# Patient Record
Sex: FEMALE | Race: WHITE | NOT HISPANIC OR LATINO | Employment: FULL TIME | URBAN - METROPOLITAN AREA
[De-identification: names, ages, dates, MRNs, and addresses within clinical notes are randomized per-mention and may not be internally consistent; named-entity substitution may affect disease eponyms.]

---

## 2017-10-18 ENCOUNTER — HOSPITAL ENCOUNTER (OUTPATIENT)
Dept: MAMMOGRAPHY | Facility: CLINIC | Age: 57
Discharge: HOME | End: 2017-10-18
Attending: OBSTETRICS & GYNECOLOGY | Admitting: OBSTETRICS & GYNECOLOGY
Payer: COMMERCIAL

## 2017-10-18 DIAGNOSIS — Z12.31 VISIT FOR SCREENING MAMMOGRAM: ICD-10-CM

## 2017-10-18 PROCEDURE — G0202 SCR MAMMO BI INCL CAD: HCPCS

## 2017-10-18 PROCEDURE — 77063 BREAST TOMOSYNTHESIS BI: CPT

## 2018-12-26 ENCOUNTER — HOSPITAL ENCOUNTER (OUTPATIENT)
Dept: MAMMOGRAPHY | Facility: CLINIC | Age: 58
Discharge: HOME | End: 2018-12-26
Attending: NURSE PRACTITIONER | Admitting: NURSE PRACTITIONER
Payer: COMMERCIAL

## 2018-12-26 DIAGNOSIS — Z12.31 VISIT FOR SCREENING MAMMOGRAM: ICD-10-CM

## 2018-12-26 PROCEDURE — 77063 BREAST TOMOSYNTHESIS BI: CPT

## 2019-02-15 ENCOUNTER — HEALTH MAINTENANCE LETTER (OUTPATIENT)
Age: 59
End: 2019-02-15

## 2019-10-02 ENCOUNTER — HEALTH MAINTENANCE LETTER (OUTPATIENT)
Age: 59
End: 2019-10-02

## 2020-01-06 ENCOUNTER — HOSPITAL ENCOUNTER (OUTPATIENT)
Dept: MAMMOGRAPHY | Facility: CLINIC | Age: 60
Discharge: HOME | End: 2020-01-06
Attending: OBSTETRICS & GYNECOLOGY | Admitting: OBSTETRICS & GYNECOLOGY
Payer: COMMERCIAL

## 2020-01-06 DIAGNOSIS — Z12.31 VISIT FOR SCREENING MAMMOGRAM: ICD-10-CM

## 2020-01-06 PROCEDURE — 77067 SCR MAMMO BI INCL CAD: CPT

## 2020-12-19 ENCOUNTER — APPOINTMENT (OUTPATIENT)
Dept: GENERAL RADIOLOGY | Facility: CLINIC | Age: 60
End: 2020-12-19
Attending: EMERGENCY MEDICINE
Payer: COMMERCIAL

## 2020-12-19 ENCOUNTER — HOSPITAL ENCOUNTER (EMERGENCY)
Facility: CLINIC | Age: 60
Discharge: HOME | End: 2020-12-19
Attending: EMERGENCY MEDICINE | Admitting: EMERGENCY MEDICINE
Payer: COMMERCIAL

## 2020-12-19 VITALS
SYSTOLIC BLOOD PRESSURE: 121 MMHG | HEART RATE: 85 BPM | DIASTOLIC BLOOD PRESSURE: 84 MMHG | HEIGHT: 67 IN | WEIGHT: 175 LBS | OXYGEN SATURATION: 96 % | BODY MASS INDEX: 27.47 KG/M2 | TEMPERATURE: 98.2 F | RESPIRATION RATE: 16 BRPM

## 2020-12-19 DIAGNOSIS — S00.03XA CONTUSION OF SCALP, INITIAL ENCOUNTER: ICD-10-CM

## 2020-12-19 DIAGNOSIS — T14.8XXA ABRASION: ICD-10-CM

## 2020-12-19 DIAGNOSIS — S09.90XA CLOSED HEAD INJURY, INITIAL ENCOUNTER: ICD-10-CM
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
## 2020-12-19 DIAGNOSIS — S20.212A RIB CONTUSION, LEFT, INITIAL ENCOUNTER: ICD-10-CM

## 2020-12-19 PROCEDURE — 71046 X-RAY EXAM CHEST 2 VIEWS: CPT

## 2020-12-19 PROCEDURE — 99283 EMERGENCY DEPT VISIT LOW MDM: CPT | Mod: 25

## 2020-12-19 ASSESSMENT — ENCOUNTER SYMPTOMS
NECK PAIN: 0
BACK PAIN: 1
ABDOMINAL PAIN: 0
ARTHRALGIAS: 0
HEADACHES: 1
WOUND: 1

## 2020-12-19 ASSESSMENT — MIFFLIN-ST. JEOR
SCORE: 1401.42
SCORE: 1401.42

## 2020-12-19 NOTE — ED PROVIDER NOTES
"  History   Chief Complaint:  Fall    HPI  Deepthi Villatoro is a 60 year old female who presents to the ED for evaluation after a fall. The patient reports walking down to the mail box earlier in the day when she slipped on thin ice and fell onto her back. She states she hit her head very hard, but did not lose consciousness. After the fall, she noticed a soreness around her back left ribcage area. She states it does not hurt much when she is sitting, but when she exerts herself, such as walking up stairs, it hurts a lot. She denies having a headache after the fall, but at the ED, she states the pain is a 4/10 and is beginning to throb. She received a small wound on her ankle, but it does not hurt. She denies any neck pain, spinal pain, abdominal pain, arm pain, or other pain.     Allergies:  Ibuprofen    Medications:    The patient is not currently taking any prescribed medications.    Past Medical History:    Facial nerve disorders  Rotator cuff sprain      Past Surgical History:    Breast biopsy - right    Family History:    CAD  Diabetes mellitus type II    Social History:  Marital Status:   Smoking status: No  Alcohol use: Yes  Drug use: No  PCP: [facility]    Review of Systems   Gastrointestinal: Negative for abdominal pain.   Musculoskeletal: Positive for back pain (left rib cage). Negative for arthralgias, gait problem and neck pain.   Skin: Positive for wound (Ankle).   Neurological: Positive for headaches. Negative for syncope.   All other systems reviewed and are negative.    Physical Exam     Patient Vitals for the past 24 hrs:   BP Temp Temp src Pulse Resp SpO2 Height Weight   12/19/20 1535 121/84 -- -- 85 -- 96 % -- --   12/19/20 1400 (!) 124/94 -- -- 93 -- 98 % -- --   12/19/20 1352 120/84 98.2  F (36.8  C) Oral 99 16 99 % 1.702 m (5' 7\") 79.4 kg (175 lb)   12/19/20 1335 (!) 166/96 98.1  F (36.7  C) Temporal 96 16 97 % 1.702 m (5' 7\") 79.4 kg (175 lb)       Physical " Exam  SKIN: Superficial abrasion to the anterior left ankle.  No ecchymoses of the left posterior torso.  No palpable soft tissue crepitus of the left posterior torso.  HEMATOLOGIC/IMMUNOLOGIC/LYMPHATIC:  No pallor.  HENT:  No facial trauma.  Superior occipital left-sided scalp contusion, 6 cm.  EYES: Normal conjunctiva.  Normal extraocular motion.  CARDIOVASCULAR:  Normal rate and a regular rhythm.  RESPIRATORY:  No respiratory distress, breath sounds equal and normal.  GASTROINTESTINAL:  Soft nontender abdomen.  MUSCULOSKELETAL: Painless active range of motion of the head neck and extremities.  Nontender left ankle and foot.  Range of motion of torso exacerbates left posterior thoracic pain.  NEUROLOGIC:  Alert, conversant, GCS 15.  Oriented to self place and time.  No aphasia or dysarthria.  No gross motor deficit.  No gross tactile sensory deficit.  PSYCHIATRIC:  Normal mood.    Emergency Department Course   Imaging:  Radiology findings were communicated with the patient who voiced understanding of the findings.    Chest XR, PA & LAT:  No acute cardiopulmonary disease.     Reading per radiology    Emergency Department Course:    Reviewed:  I reviewed the patient's nursing notes, vitals, past medical records, Care Everywhere.     Assessments:  1354 I first assessed the patient and performed an exam.    1516 I rechecked the patient and updated her on her results. Discussed plans for discharge.    Disposition:  The patient was discharged to home.       Impression & Plan    Medical Decision Making:   Deepthi Villatoro is a 60 year old female who presents after a fall from a standing height, striking her head and injuring her left posterior thorax. An associated injury to the left ankle, but just an abrasion. Based on the history and the exam, I did not think she required brain imaging. I was concerned for pneumothorax or rib fracture, hence imageing performed of the chest, gladly negative. I advised conservative  management of her injuries with ice, tylenol or ibuprofen but to return if any concerns regarding her trauma.    Diagnosis:     ICD-10-CM    1. Closed head injury, initial encounter  S09.90XA    2. Contusion of scalp, initial encounter  S00.03XA    3. Rib contusion, left, initial encounter  S20.212A    4. Abrasion  T14.8XXA    5. Fall, initial encounter  W19.XXXA        Disposition:   Discharged to home.    Scribe Disclosure:  YAEL, Gabrielle Gr, am serving as a scribe on 12/19/2020 at 1:54 PM to personally document services performed by Watson Valle MD based on my observations and the provider's statements to me.           Watson Valle MD  12/19/20 [id number]

## 2020-12-19 NOTE — ED NOTES
Pt provided with ice for posterior head, denies pain medications at this time. Will continue to monitor.

## 2020-12-19 NOTE — ED AVS SNAPSHOT
[facility]  [address] [address]  Phone: [phone]  Fax: [phone]                                    Deepthi Villatoro   MRN: [id number]    Department: [facility]   Date of Visit: 12/19/2020           After Visit Summary Signature Page    I have received my discharge instructions, and my questions have been answered. I have discussed any challenges I see with this plan with the nurse or doctor.    ..........................................................................................................................................  Patient/Patient Representative Signature      ..........................................................................................................................................  Patient Representative Print Name and Relationship to Patient    ..................................................               ................................................  Date                                   Time    ..........................................................................................................................................  Reviewed by Signature/Title    ...................................................              ..............................................  Date                                               Time          22EPIC Rev 08/18

## 2020-12-19 NOTE — ED TRIAGE NOTES
Pt slipped on ice in the driveway. Pt states when she slipped she hit her posterior head, L flank, and R ankle.

## 2021-01-15 ENCOUNTER — HEALTH MAINTENANCE LETTER (OUTPATIENT)
Age: 61
End: 2021-01-15

## 2021-01-25 ENCOUNTER — HOSPITAL ENCOUNTER (OUTPATIENT)
Dept: MAMMOGRAPHY | Facility: CLINIC | Age: 61
Discharge: HOME | End: 2021-01-25
Attending: OBSTETRICS & GYNECOLOGY | Admitting: OBSTETRICS & GYNECOLOGY
Payer: COMMERCIAL

## 2021-01-25 DIAGNOSIS — Z12.31 VISIT FOR SCREENING MAMMOGRAM: ICD-10-CM

## 2021-01-25 PROCEDURE — 77063 BREAST TOMOSYNTHESIS BI: CPT

## 2021-09-04 ENCOUNTER — HEALTH MAINTENANCE LETTER (OUTPATIENT)
Age: 61
End: 2021-09-04

## 2021-11-25 ENCOUNTER — APPOINTMENT (OUTPATIENT)
Dept: GENERAL RADIOLOGY | Facility: CLINIC | Age: 61
DRG: 273 | End: 2021-11-25
Attending: NURSE PRACTITIONER
Payer: COMMERCIAL

## 2021-11-25 ENCOUNTER — HOSPITAL ENCOUNTER (INPATIENT)
Facility: CLINIC | Age: 61
LOS: 2 days | Discharge: HOME | DRG: 273 | End: 2021-11-27
Attending: EMERGENCY MEDICINE | Admitting: INTERNAL MEDICINE
Payer: COMMERCIAL

## 2021-11-25 DIAGNOSIS — I47.10 PAROXYSMAL SUPRAVENTRICULAR TACHYCARDIA (H): ICD-10-CM

## 2021-11-25 DIAGNOSIS — I21.4 NSTEMI (NON-ST ELEVATED MYOCARDIAL INFARCTION) (H): Primary | ICD-10-CM

## 2021-11-25 LAB
ANION GAP SERPL CALCULATED.3IONS-SCNC: 5 MMOL/L (ref 3–14)
APTT PPP: 30 SECONDS (ref 22–38)
ATRIAL RATE - MUSE: 110 BPM
ATRIAL RATE - MUSE: 220 BPM
ATRIAL RATE - MUSE: 83 BPM
BUN SERPL-MCNC: 14 MG/DL (ref 7–30)
CALCIUM SERPL-MCNC: 8.7 MG/DL (ref 8.5–10.1)
CHLORIDE BLD-SCNC: 107 MMOL/L (ref 94–109)
CO2 SERPL-SCNC: 28 MMOL/L (ref 20–32)
CREAT SERPL-MCNC: 0.81 MG/DL (ref 0.52–1.04)
DIASTOLIC BLOOD PRESSURE - MUSE: NORMAL MMHG
ERYTHROCYTE [DISTWIDTH] IN BLOOD BY AUTOMATED COUNT: 13.2 % (ref 10–15)
ERYTHROCYTE [DISTWIDTH] IN BLOOD BY AUTOMATED COUNT: 13.3 % (ref 10–15)
GFR SERPL CREATININE-BSD FRML MDRD: 79 ML/MIN/1.73M2
GLUCOSE BLD-MCNC: 147 MG/DL (ref 70–99)
HCT VFR BLD AUTO: 39.3 % (ref 35–47)
HCT VFR BLD AUTO: 44.3 % (ref 35–47)
HGB BLD-MCNC: 12.6 G/DL (ref 11.7–15.7)
HGB BLD-MCNC: 14.3 G/DL (ref 11.7–15.7)
HOLD SPECIMEN: NORMAL
INR PPP: 0.94 (ref 0.85–1.15)
INTERPRETATION ECG - MUSE: NORMAL
MAGNESIUM SERPL-MCNC: 2.1 MG/DL (ref 1.6–2.3)
MCH RBC QN AUTO: 28.4 PG (ref 26.5–33)
MCH RBC QN AUTO: 28.9 PG (ref 26.5–33)
MCHC RBC AUTO-ENTMCNC: 32.1 G/DL (ref 31.5–36.5)
MCHC RBC AUTO-ENTMCNC: 32.3 G/DL (ref 31.5–36.5)
MCV RBC AUTO: 89 FL (ref 78–100)
MCV RBC AUTO: 90 FL (ref 78–100)
NT-PROBNP SERPL-MCNC: 53 PG/ML (ref 0–900)
P AXIS - MUSE: 62 DEGREES
P AXIS - MUSE: 63 DEGREES
P AXIS - MUSE: NORMAL DEGREES
PLATELET # BLD AUTO: 277 10E3/UL (ref 150–450)
PLATELET # BLD AUTO: 336 10E3/UL (ref 150–450)
POTASSIUM BLD-SCNC: 3.5 MMOL/L (ref 3.4–5.3)
PR INTERVAL - MUSE: 144 MS
PR INTERVAL - MUSE: 148 MS
PR INTERVAL - MUSE: NORMAL MS
QRS DURATION - MUSE: 162 MS
QRS DURATION - MUSE: 76 MS
QRS DURATION - MUSE: 86 MS
QT - MUSE: 206 MS
QT - MUSE: 342 MS
QT - MUSE: 372 MS
QTC - MUSE: 393 MS
QTC - MUSE: 437 MS
QTC - MUSE: 462 MS
R AXIS - MUSE: 22 DEGREES
R AXIS - MUSE: 33 DEGREES
R AXIS - MUSE: 37 DEGREES
RBC # BLD AUTO: 4.44 10E6/UL (ref 3.8–5.2)
RBC # BLD AUTO: 4.95 10E6/UL (ref 3.8–5.2)
SARS-COV-2 RNA RESP QL NAA+PROBE: NEGATIVE
SODIUM SERPL-SCNC: 140 MMOL/L (ref 133–144)
SYSTOLIC BLOOD PRESSURE - MUSE: NORMAL MMHG
T AXIS - MUSE: 0 DEGREES
T AXIS - MUSE: 21 DEGREES
T AXIS - MUSE: 52 DEGREES
TROPONIN I SERPL HS-MCNC: 11 NG/L
TROPONIN I SERPL HS-MCNC: 721 NG/L
TROPONIN I SERPL-MCNC: 0.84 UG/L (ref 0–0.04)
TROPONIN I SERPL-MCNC: <0.015 UG/L (ref 0–0.04)
TSH SERPL DL<=0.005 MIU/L-ACNC: 2.13 MU/L (ref 0.4–4)
VENTRICULAR RATE- MUSE: 110 BPM
VENTRICULAR RATE- MUSE: 219 BPM
VENTRICULAR RATE- MUSE: 83 BPM
WBC # BLD AUTO: 11.1 10E3/UL (ref 4–11)
WBC # BLD AUTO: 13.4 10E3/UL (ref 4–11)

## 2021-11-25 PROCEDURE — 84443 ASSAY THYROID STIM HORMONE: CPT | Performed by: PHYSICIAN ASSISTANT

## 2021-11-25 PROCEDURE — 96376 TX/PRO/DX INJ SAME DRUG ADON: CPT

## 2021-11-25 PROCEDURE — 250N000009 HC RX 250: Performed by: EMERGENCY MEDICINE

## 2021-11-25 PROCEDURE — 96365 THER/PROPH/DIAG IV INF INIT: CPT

## 2021-11-25 PROCEDURE — 83880 ASSAY OF NATRIURETIC PEPTIDE: CPT | Performed by: EMERGENCY MEDICINE

## 2021-11-25 PROCEDURE — 210N000002 HC R&B HEART CARE

## 2021-11-25 PROCEDURE — 71046 X-RAY EXAM CHEST 2 VIEWS: CPT

## 2021-11-25 PROCEDURE — 99285 EMERGENCY DEPT VISIT HI MDM: CPT | Mod: 25

## 2021-11-25 PROCEDURE — 83735 ASSAY OF MAGNESIUM: CPT | Performed by: PHYSICIAN ASSISTANT

## 2021-11-25 PROCEDURE — C9803 HOPD COVID-19 SPEC COLLECT: HCPCS

## 2021-11-25 PROCEDURE — 85027 COMPLETE CBC AUTOMATED: CPT | Performed by: PHYSICIAN ASSISTANT

## 2021-11-25 PROCEDURE — 258N000003 HC RX IP 258 OP 636: Performed by: EMERGENCY MEDICINE

## 2021-11-25 PROCEDURE — 99223 1ST HOSP IP/OBS HIGH 75: CPT | Mod: AI | Performed by: PHYSICIAN ASSISTANT

## 2021-11-25 PROCEDURE — 36415 COLL VENOUS BLD VENIPUNCTURE: CPT | Performed by: EMERGENCY MEDICINE

## 2021-11-25 PROCEDURE — 84484 ASSAY OF TROPONIN QUANT: CPT | Mod: 91 | Performed by: NURSE PRACTITIONER

## 2021-11-25 PROCEDURE — 250N000011 HC RX IP 250 OP 636: Performed by: EMERGENCY MEDICINE

## 2021-11-25 PROCEDURE — 85610 PROTHROMBIN TIME: CPT | Performed by: EMERGENCY MEDICINE

## 2021-11-25 PROCEDURE — 85730 THROMBOPLASTIN TIME PARTIAL: CPT | Performed by: EMERGENCY MEDICINE

## 2021-11-25 PROCEDURE — 85027 COMPLETE CBC AUTOMATED: CPT | Performed by: NURSE PRACTITIONER

## 2021-11-25 PROCEDURE — 250N000013 HC RX MED GY IP 250 OP 250 PS 637: Performed by: PHYSICIAN ASSISTANT

## 2021-11-25 PROCEDURE — 96375 TX/PRO/DX INJ NEW DRUG ADDON: CPT

## 2021-11-25 PROCEDURE — 84484 ASSAY OF TROPONIN QUANT: CPT | Performed by: PHYSICIAN ASSISTANT

## 2021-11-25 PROCEDURE — 36415 COLL VENOUS BLD VENIPUNCTURE: CPT | Performed by: PHYSICIAN ASSISTANT

## 2021-11-25 PROCEDURE — 93005 ELECTROCARDIOGRAM TRACING: CPT | Mod: 76

## 2021-11-25 PROCEDURE — 87635 SARS-COV-2 COVID-19 AMP PRB: CPT | Performed by: EMERGENCY MEDICINE

## 2021-11-25 PROCEDURE — 93005 ELECTROCARDIOGRAM TRACING: CPT

## 2021-11-25 PROCEDURE — 82310 ASSAY OF CALCIUM: CPT | Performed by: NURSE PRACTITIONER

## 2021-11-25 RX ORDER — LIFITEGRAST 50 MG/ML
1 SOLUTION/ DROPS OPHTHALMIC 2 TIMES DAILY
COMMUNITY

## 2021-11-25 RX ORDER — HEPARIN SODIUM 10000 [USP'U]/100ML
0-5000 INJECTION, SOLUTION INTRAVENOUS CONTINUOUS
Status: DISCONTINUED | OUTPATIENT
Start: 2021-11-25 | End: 2021-11-25

## 2021-11-25 RX ORDER — VITAMIN B COMPLEX
25 TABLET ORAL DAILY
COMMUNITY

## 2021-11-25 RX ORDER — ACETAMINOPHEN 325 MG/1
650 TABLET ORAL EVERY 6 HOURS PRN
Status: DISCONTINUED | OUTPATIENT
Start: 2021-11-25 | End: 2021-11-26

## 2021-11-25 RX ORDER — ONDANSETRON 2 MG/ML
4 INJECTION INTRAMUSCULAR; INTRAVENOUS EVERY 6 HOURS PRN
Status: DISCONTINUED | OUTPATIENT
Start: 2021-11-25 | End: 2021-11-26

## 2021-11-25 RX ORDER — ACETAMINOPHEN 650 MG/1
650 SUPPOSITORY RECTAL EVERY 6 HOURS PRN
Status: DISCONTINUED | OUTPATIENT
Start: 2021-11-25 | End: 2021-11-26

## 2021-11-25 RX ORDER — LIDOCAINE 40 MG/G
CREAM TOPICAL
Status: DISCONTINUED | OUTPATIENT
Start: 2021-11-25 | End: 2021-11-26

## 2021-11-25 RX ORDER — NITROGLYCERIN 0.4 MG/1
0.4 TABLET SUBLINGUAL EVERY 5 MIN PRN
Status: DISCONTINUED | OUTPATIENT
Start: 2021-11-25 | End: 2021-11-26

## 2021-11-25 RX ORDER — HEPARIN SODIUM 10000 [USP'U]/100ML
0-5000 INJECTION, SOLUTION INTRAVENOUS CONTINUOUS
Status: DISCONTINUED | OUTPATIENT
Start: 2021-11-25 | End: 2021-11-26

## 2021-11-25 RX ORDER — METOPROLOL TARTRATE 1 MG/ML
5 INJECTION, SOLUTION INTRAVENOUS ONCE
Status: COMPLETED | OUTPATIENT
Start: 2021-11-25 | End: 2021-11-25

## 2021-11-25 RX ORDER — ONDANSETRON 4 MG/1
4 TABLET, ORALLY DISINTEGRATING ORAL EVERY 6 HOURS PRN
Status: DISCONTINUED | OUTPATIENT
Start: 2021-11-25 | End: 2021-11-26

## 2021-11-25 RX ADMIN — METOPROLOL TARTRATE 12.5 MG: 25 TABLET, FILM COATED ORAL at 22:38

## 2021-11-25 RX ADMIN — METOPROLOL TARTRATE 5 MG: 5 INJECTION INTRAVENOUS at 17:53

## 2021-11-25 RX ADMIN — HEPARIN SODIUM 1100 UNITS/HR: 1000 INJECTION, SOLUTION INTRAVENOUS; SUBCUTANEOUS at 20:09

## 2021-11-25 RX ADMIN — SODIUM CHLORIDE 500 ML: 9 INJECTION, SOLUTION INTRAVENOUS at 17:34

## 2021-11-25 ASSESSMENT — ACTIVITIES OF DAILY LIVING (ADL)
WALKING_OR_CLIMBING_STAIRS_DIFFICULTY: NO
ADLS_ACUITY_SCORE: 5
ADLS_ACUITY_SCORE: 5
FALL_HISTORY_WITHIN_LAST_SIX_MONTHS: NO
ADLS_ACUITY_SCORE: 5
DIFFICULTY_EATING/SWALLOWING: NO
ADLS_ACUITY_SCORE: 4
DOING_ERRANDS_INDEPENDENTLY_DIFFICULTY: NO
WEAR_GLASSES_OR_BLIND: YES
DIFFICULTY_COMMUNICATING: NO
DRESSING/BATHING_DIFFICULTY: NO
TOILETING_ISSUES: NO
CONCENTRATING,_REMEMBERING_OR_MAKING_DECISIONS_DIFFICULTY: NO
ADLS_ACUITY_SCORE: 5
VISION_MANAGEMENT: CONTACTS

## 2021-11-25 ASSESSMENT — ENCOUNTER SYMPTOMS
ARTHRALGIAS: 1
NAUSEA: 0
SHORTNESS OF BREATH: 0
CHEST TIGHTNESS: 1

## 2021-11-25 NOTE — ED PROVIDER NOTES
History   Chief Complaint:  Chest      HPI   Deepthi Villatoro is a 61 year old female who presents with jaw pain and chest pressure that started earlier today. An hour ago the patient was bending down and she felt some pain in her jaw and felt her pulse racing. She sat down and started to feel pressure in her chest and back. She had a similar episode a couple weeks ago. She denies nausea or shortness of breath. She exercises regularly. She has a family history of CAD.     Review of Systems   Respiratory: Positive for chest tightness. Negative for shortness of breath.    Gastrointestinal: Negative for nausea.   Musculoskeletal: Positive for arthralgias.   All other systems reviewed and are negative.        Allergies:  Ibuprofen    Medications:  The patient is currently on no regular medications.    Past Medical History:     Diverticular disease  Benign neoplasm of colon    Past Surgical History:    Colonoscopy  Breast biopsy    Family History:    CAD   DM    Social History:  The patient presents with family member.     Physical Exam     Patient Vitals for the past 24 hrs:   BP Temp Temp src Pulse Resp SpO2 Weight   11/25/21 1830 129/76 -- -- 86 -- 98 % --   11/25/21 1800 127/76 -- -- 84 9 91 % --   11/25/21 1730 -- -- -- 110 13 93 % --   11/25/21 1719 (!) 151/79 97.6  F (36.4  C) Oral (!) 225 20 96 % 91.2 kg (201 lb)       Physical Exam  Nursing note and vitals reviewed.    Constitutional:  Appears comfortable.    HENT:    Nose normal.  No discharge.      Oral mucosa is moist.  Eyes:    Conjunctivae are normal without injection.  Pupils are equal.  Cardiovascular:  Rapid regular pulse     Normal heart sounds and peripheral pulses 2+ and equal.    Pulmonary:  Effort normal and breath sounds clear to auscultation bilaterally..    GI:    Soft. No distension and no mass. No tenderness.   Musculoskeletal:  Normal range of motion. No extremity deformity.     No edema and no tenderness.    Neurological:   Alert and  oriented. No focal weakness.     Exhibits good muscle tone. Coordination normal.      GCS eye subscore is 4. GCS verbal subscore is 5.      GCS motor subscore is 6.   Skin:    Skin is warm and dry. No rash noted. No diaphoresis.      No erythema. No pallor.  No lesions.  Psychiatric:   Behavior is normal. Appropriate mood and affect.     Judgment and thought content normal.     Emergency Department Course   ECG  ECG obtained at 1716, ECG read at 1737  Wide QRS tachycardia  Nonspecific intraventricular block   Rate 219 bpm. DE interval * ms. QRS duration 162 ms. QT/QTc 206/393 ms. P-R-T axes * 37 52.     ECG 2  ECG taken at 1728, ECG read at 1737  Sinus tachycardia  Possible left atrial enlargement  Marked ST abnormality, possible inferior subendocardial injury   Rate 110 bpm. DE interval 144 ms. QRS duration 776 ms. QT/QTc 342/462 ms. P-R-T axes 62 22 0.     ECG 3  ECG taken at 1804, ECG read at 1810  Normal sinus rhythm  Possible left atrial enlargement   Rate 83 bpm. DE interval 148 ms. QRS duration 86 ms. QT/QTc 372/437 ms. P-R-T axes 63 33 21.       Imaging:  XR Chest 2 Views   Final Result   IMPRESSION: Negative chest.        Report per radiology    Laboratory:  Labs Ordered and Resulted from Time of ED Arrival to Time of ED Departure   CBC WITH PLATELETS - Abnormal       Result Value    WBC Count 13.4 (*)     RBC Count 4.95      Hemoglobin 14.3      Hematocrit 44.3      MCV 90      MCH 28.9      MCHC 32.3      RDW 13.3      Platelet Count 336     BASIC METABOLIC PANEL - Abnormal    Sodium 140      Potassium 3.5      Chloride 107      Carbon Dioxide (CO2) 28      Anion Gap 5      Urea Nitrogen 14      Creatinine 0.81      Calcium 8.7      Glucose 147 (*)     GFR Estimate 79     TROPONIN I - Normal    Troponin I High Sensitivity 11     TROPONIN I - Normal    Troponin I <0.015     NT PROBNP INPATIENT - Normal    N terminal Pro BNP Inpatient 53     INR - Normal    INR 0.94     PARTIAL THROMBOPLASTIN TIME - Normal     aPTT 30     COVID-19 VIRUS (CORONAVIRUS) BY PCR       Emergency Department Course:  Reviewed:  I reviewed nursing notes, vitals, past medical history and Care Everywhere    Assessments:  1734 I obtained history and examined the patient as noted above.     1849 I rechecked the patient and explained findings.     Consults:  1744 I spoke with Dr. Hough, cardiologist, regarding the patient's condition.    1803 I spoke with Dr. Hough    1811 I spoke with Dr. Hough regarding next steps    1845 I spoke with Dr. Green, hospitalist, who agreed to admit the patient.    Interventions:  1734 NaCl Bolus 500 mL IV    1753 Lopressor 5 mg IV    Disposition:  The patient was admitted to the hospital under the care of Dr. Green.     Impression & Plan       Medical Decision Making:  With narrow complex rapid tachycardia.  She had some chest tightness and jaw pain with it.  The EKG did show some ST depression inferiorly and laterally and some elevation in V1 and aVR.  While the IV was being placed she converted spontaneously to sinus rhythm.  EKG initially still showed the ST depression inferiorly and laterally and elevation in V1 and aVR.  I talked with Dr. Hough the cardiologist and he reviewed the EKGs and agreed that this was a little concerning but did not want to activate the Cath Lab.  Her symptoms completely resolve when she converted to sinus rhythm.  Her pulse rate was in the 110 range.  He recommended 5 mg of IV metoprolol which was given.  Her pulse rate then came down into the 80s and he wanted a third EKG which was obtained and it came back with most of the changes essentially resolved.  It is possible that she may have a fixed lesion that caused the transient ischemic changes.  She may also have a accessory pathway causing the SVT.  It sounds like she may have had a brief episode 2 weeks ago.  Dr. Hough wanted her admitted and started on heparin and they will see her in the morning to determine further  testing whether it be an angiogram and or EP study.  I talked with Dr. Green who will be admitting the patient.  Her labs came back showing a mildly elevated white count of 13.4.  The rest of her labs were essentially normal including her initial troponin.  Chest x-ray was also normal.    Critical Care Time: was 50 minutes for this patient excluding procedures    Diagnosis:    ICD-10-CM    1. Paroxysmal supraventricular tachycardia (H)  I47.1        Scribe Disclosure:  YAEL, Patricia Varghese, am serving as a scribe at 5:23 PM on 11/25/2021 to document services personally performed by Leidy Barbosa MD based on my observations and the provider's statements to me.            Leidy Barbosa MD  11/25/21 1946

## 2021-11-26 ENCOUNTER — APPOINTMENT (OUTPATIENT)
Dept: CARDIOLOGY | Facility: CLINIC | Age: 61
DRG: 273 | End: 2021-11-26
Attending: PHYSICIAN ASSISTANT
Payer: COMMERCIAL

## 2021-11-26 LAB
ANION GAP SERPL CALCULATED.3IONS-SCNC: 5 MMOL/L (ref 3–14)
BUN SERPL-MCNC: 10 MG/DL (ref 7–30)
CALCIUM SERPL-MCNC: 8.5 MG/DL (ref 8.5–10.1)
CHLORIDE BLD-SCNC: 109 MMOL/L (ref 94–109)
CHOLEST SERPL-MCNC: 147 MG/DL
CO2 SERPL-SCNC: 27 MMOL/L (ref 20–32)
CREAT SERPL-MCNC: 0.66 MG/DL (ref 0.52–1.04)
GFR SERPL CREATININE-BSD FRML MDRD: >90 ML/MIN/1.73M2
GLUCOSE BLD-MCNC: 108 MG/DL (ref 70–99)
HDLC SERPL-MCNC: 48 MG/DL
LDLC SERPL CALC-MCNC: 80 MG/DL
LVEF ECHO: NORMAL
NONHDLC SERPL-MCNC: 99 MG/DL
POTASSIUM BLD-SCNC: 3.6 MMOL/L (ref 3.4–5.3)
SODIUM SERPL-SCNC: 141 MMOL/L (ref 133–144)
TRIGL SERPL-MCNC: 95 MG/DL
TROPONIN I SERPL HS-MCNC: 480 NG/L
TROPONIN I SERPL HS-MCNC: 673 NG/L
TROPONIN I SERPL HS-MCNC: 858 NG/L
TROPONIN I SERPL-MCNC: 0.51 UG/L (ref 0–0.04)
TROPONIN I SERPL-MCNC: 0.76 UG/L (ref 0–0.04)
TROPONIN I SERPL-MCNC: 1 UG/L (ref 0–0.04)
UFH PPP CHRO-ACNC: 0.22 IU/ML
UFH PPP CHRO-ACNC: 0.42 IU/ML

## 2021-11-26 PROCEDURE — 85347 COAGULATION TIME ACTIVATED: CPT

## 2021-11-26 PROCEDURE — C1769 GUIDE WIRE: HCPCS | Performed by: INTERNAL MEDICINE

## 2021-11-26 PROCEDURE — C1894 INTRO/SHEATH, NON-LASER: HCPCS | Performed by: INTERNAL MEDICINE

## 2021-11-26 PROCEDURE — 02583ZZ DESTRUCTION OF CONDUCTION MECHANISM, PERCUTANEOUS APPROACH: ICD-10-PCS | Performed by: INTERNAL MEDICINE

## 2021-11-26 PROCEDURE — 82374 ASSAY BLOOD CARBON DIOXIDE: CPT | Performed by: PHYSICIAN ASSISTANT

## 2021-11-26 PROCEDURE — 93306 TTE W/DOPPLER COMPLETE: CPT | Mod: 26 | Performed by: INTERNAL MEDICINE

## 2021-11-26 PROCEDURE — 85520 HEPARIN ASSAY: CPT | Performed by: PHYSICIAN ASSISTANT

## 2021-11-26 PROCEDURE — 93653 COMPRE EP EVAL TX SVT: CPT | Performed by: INTERNAL MEDICINE

## 2021-11-26 PROCEDURE — 85520 HEPARIN ASSAY: CPT | Performed by: HOSPITALIST

## 2021-11-26 PROCEDURE — 250N000013 HC RX MED GY IP 250 OP 250 PS 637: Performed by: PHYSICIAN ASSISTANT

## 2021-11-26 PROCEDURE — 93462 L HRT CATH TRNSPTL PUNCTURE: CPT | Performed by: INTERNAL MEDICINE

## 2021-11-26 PROCEDURE — C1759 CATH, INTRA ECHOCARDIOGRAPHY: HCPCS | Performed by: INTERNAL MEDICINE

## 2021-11-26 PROCEDURE — 36415 COLL VENOUS BLD VENIPUNCTURE: CPT | Performed by: PHYSICIAN ASSISTANT

## 2021-11-26 PROCEDURE — 99222 1ST HOSP IP/OBS MODERATE 55: CPT | Mod: 25 | Performed by: INTERNAL MEDICINE

## 2021-11-26 PROCEDURE — C1733 CATH, EP, OTHR THAN COOL-TIP: HCPCS | Performed by: INTERNAL MEDICINE

## 2021-11-26 PROCEDURE — 4A023FZ MEASUREMENT OF CARDIAC RHYTHM, PERCUTANEOUS APPROACH: ICD-10-PCS | Performed by: INTERNAL MEDICINE

## 2021-11-26 PROCEDURE — 250N000009 HC RX 250: Performed by: INTERNAL MEDICINE

## 2021-11-26 PROCEDURE — 258N000003 HC RX IP 258 OP 636: Performed by: INTERNAL MEDICINE

## 2021-11-26 PROCEDURE — 999N000208 ECHOCARDIOGRAM COMPLETE

## 2021-11-26 PROCEDURE — 02K83ZZ MAP CONDUCTION MECHANISM, PERCUTANEOUS APPROACH: ICD-10-PCS | Performed by: INTERNAL MEDICINE

## 2021-11-26 PROCEDURE — 272N000001 HC OR GENERAL SUPPLY STERILE: Performed by: INTERNAL MEDICINE

## 2021-11-26 PROCEDURE — 93662 INTRACARDIAC ECG (ICE): CPT | Performed by: INTERNAL MEDICINE

## 2021-11-26 PROCEDURE — C1732 CATH, EP, DIAG/ABL, 3D/VECT: HCPCS | Performed by: INTERNAL MEDICINE

## 2021-11-26 PROCEDURE — 93613 INTRACARDIAC EPHYS 3D MAPG: CPT

## 2021-11-26 PROCEDURE — C1730 CATH, EP, 19 OR FEW ELECT: HCPCS | Performed by: INTERNAL MEDICINE

## 2021-11-26 PROCEDURE — 99232 SBSQ HOSP IP/OBS MODERATE 35: CPT | Performed by: HOSPITALIST

## 2021-11-26 PROCEDURE — 80061 LIPID PANEL: CPT | Performed by: PHYSICIAN ASSISTANT

## 2021-11-26 PROCEDURE — 210N000002 HC R&B HEART CARE

## 2021-11-26 PROCEDURE — 84484 ASSAY OF TROPONIN QUANT: CPT | Performed by: PHYSICIAN ASSISTANT

## 2021-11-26 PROCEDURE — 93613 INTRACARDIAC EPHYS 3D MAPG: CPT | Performed by: INTERNAL MEDICINE

## 2021-11-26 PROCEDURE — 250N000011 HC RX IP 250 OP 636: Performed by: INTERNAL MEDICINE

## 2021-11-26 PROCEDURE — 250N000013 HC RX MED GY IP 250 OP 250 PS 637: Performed by: INTERNAL MEDICINE

## 2021-11-26 PROCEDURE — 84484 ASSAY OF TROPONIN QUANT: CPT | Mod: 91 | Performed by: PHYSICIAN ASSISTANT

## 2021-11-26 PROCEDURE — 4A0234Z MEASUREMENT OF CARDIAC ELECTRICAL ACTIVITY, PERCUTANEOUS APPROACH: ICD-10-PCS | Performed by: INTERNAL MEDICINE

## 2021-11-26 PROCEDURE — 93621 COMP EP EVL L PAC&REC C SINS: CPT

## 2021-11-26 PROCEDURE — 255N000002 HC RX 255 OP 636: Performed by: INTERNAL MEDICINE

## 2021-11-26 PROCEDURE — 82310 ASSAY OF CALCIUM: CPT | Performed by: PHYSICIAN ASSISTANT

## 2021-11-26 RX ORDER — FENTANYL CITRATE 50 UG/ML
INJECTION, SOLUTION INTRAMUSCULAR; INTRAVENOUS
Status: DISCONTINUED | OUTPATIENT
Start: 2021-11-26 | End: 2021-11-26 | Stop reason: HOSPADM

## 2021-11-26 RX ORDER — NALOXONE HYDROCHLORIDE 0.4 MG/ML
0.2 INJECTION, SOLUTION INTRAMUSCULAR; INTRAVENOUS; SUBCUTANEOUS
Status: DISCONTINUED | OUTPATIENT
Start: 2021-11-26 | End: 2021-11-27 | Stop reason: HOSPADM

## 2021-11-26 RX ORDER — NALOXONE HYDROCHLORIDE 0.4 MG/ML
0.4 INJECTION, SOLUTION INTRAMUSCULAR; INTRAVENOUS; SUBCUTANEOUS
Status: DISCONTINUED | OUTPATIENT
Start: 2021-11-26 | End: 2021-11-27 | Stop reason: HOSPADM

## 2021-11-26 RX ORDER — DOBUTAMINE HYDROCHLORIDE 200 MG/100ML
5-40 INJECTION INTRAVENOUS CONTINUOUS PRN
Status: DISCONTINUED | OUTPATIENT
Start: 2021-11-26 | End: 2021-11-26 | Stop reason: HOSPADM

## 2021-11-26 RX ORDER — CEFAZOLIN SODIUM 1 G/3ML
1 INJECTION, POWDER, FOR SOLUTION INTRAMUSCULAR; INTRAVENOUS
Status: DISCONTINUED | OUTPATIENT
Start: 2021-11-26 | End: 2021-11-26 | Stop reason: HOSPADM

## 2021-11-26 RX ORDER — OXYCODONE AND ACETAMINOPHEN 5; 325 MG/1; MG/1
1 TABLET ORAL EVERY 4 HOURS PRN
Status: DISCONTINUED | OUTPATIENT
Start: 2021-11-26 | End: 2021-11-27 | Stop reason: HOSPADM

## 2021-11-26 RX ORDER — HEPARIN SODIUM 1000 [USP'U]/ML
INJECTION, SOLUTION INTRAVENOUS; SUBCUTANEOUS
Status: DISCONTINUED | OUTPATIENT
Start: 2021-11-26 | End: 2021-11-26 | Stop reason: HOSPADM

## 2021-11-26 RX ORDER — SODIUM CHLORIDE, SODIUM LACTATE, POTASSIUM CHLORIDE, CALCIUM CHLORIDE 600; 310; 30; 20 MG/100ML; MG/100ML; MG/100ML; MG/100ML
INJECTION, SOLUTION INTRAVENOUS CONTINUOUS
Status: DISCONTINUED | OUTPATIENT
Start: 2021-11-26 | End: 2021-11-26 | Stop reason: HOSPADM

## 2021-11-26 RX ORDER — METOPROLOL TARTRATE 25 MG/1
25 TABLET, FILM COATED ORAL 2 TIMES DAILY
Status: DISCONTINUED | OUTPATIENT
Start: 2021-11-26 | End: 2021-11-27 | Stop reason: HOSPADM

## 2021-11-26 RX ORDER — PROTAMINE SULFATE 10 MG/ML
INJECTION, SOLUTION INTRAVENOUS
Status: DISCONTINUED | OUTPATIENT
Start: 2021-11-26 | End: 2021-11-26 | Stop reason: HOSPADM

## 2021-11-26 RX ORDER — MIDAZOLAM HCL IN 0.9 % NACL/PF 1 MG/ML
.5-6 PLASTIC BAG, INJECTION (ML) INTRAVENOUS CONTINUOUS PRN
Status: DISCONTINUED | OUTPATIENT
Start: 2021-11-26 | End: 2021-11-26 | Stop reason: HOSPADM

## 2021-11-26 RX ORDER — HEPARIN SODIUM 200 [USP'U]/100ML
100-500 INJECTION, SOLUTION INTRAVENOUS CONTINUOUS PRN
Status: DISCONTINUED | OUTPATIENT
Start: 2021-11-26 | End: 2021-11-26 | Stop reason: HOSPADM

## 2021-11-26 RX ORDER — ASPIRIN 81 MG/1
81 TABLET ORAL DAILY
Status: DISCONTINUED | OUTPATIENT
Start: 2021-11-26 | End: 2021-11-27 | Stop reason: HOSPADM

## 2021-11-26 RX ORDER — LIDOCAINE 40 MG/G
CREAM TOPICAL
Status: DISCONTINUED | OUTPATIENT
Start: 2021-11-26 | End: 2021-11-26 | Stop reason: HOSPADM

## 2021-11-26 RX ORDER — HEPARIN SODIUM 200 [USP'U]/100ML
100-600 INJECTION, SOLUTION INTRAVENOUS CONTINUOUS PRN
Status: DISCONTINUED | OUTPATIENT
Start: 2021-11-26 | End: 2021-11-26 | Stop reason: HOSPADM

## 2021-11-26 RX ADMIN — ASPIRIN 81 MG: 81 TABLET, COATED ORAL at 20:41

## 2021-11-26 RX ADMIN — METOPROLOL TARTRATE 12.5 MG: 25 TABLET, FILM COATED ORAL at 08:39

## 2021-11-26 RX ADMIN — HUMAN ALBUMIN MICROSPHERES AND PERFLUTREN 3 ML: 10; .22 INJECTION, SOLUTION INTRAVENOUS at 09:35

## 2021-11-26 RX ADMIN — SODIUM CHLORIDE, POTASSIUM CHLORIDE, SODIUM LACTATE AND CALCIUM CHLORIDE: 600; 310; 30; 20 INJECTION, SOLUTION INTRAVENOUS at 12:38

## 2021-11-26 RX ADMIN — METOPROLOL TARTRATE 25 MG: 25 TABLET, FILM COATED ORAL at 20:41

## 2021-11-26 ASSESSMENT — ACTIVITIES OF DAILY LIVING (ADL)
ADLS_ACUITY_SCORE: 4

## 2021-11-26 NOTE — PRE-PROCEDURE
GENERAL PRE-PROCEDURE:   Procedure:  SVT    Written consent obtained?: Yes    Risks and benefits: Risks, benefits and alternatives were discussed    Consent given by:  Patient  Patient states understanding of procedure being performed: Yes    Patient's understanding of procedure matches consent: Yes    Procedure consent matches procedure scheduled: Yes    Expected level of sedation:  Moderate  Appropriately NPO:  Yes  Mallampati  :  Grade 2- soft palate, base of uvula, tonsillar pillars, and portion of posterior pharyngeal wall visible  Lungs:  Lungs clear with good breath sounds bilaterally  Heart:  Normal heart sounds and rate  History & Physical reviewed:  History and physical reviewed and no updates needed  Statement of review:  I have reviewed the lab findings, diagnostic data, medications, and the plan for sedation

## 2021-11-26 NOTE — PLAN OF CARE
A&Ox4. /81 (BP Location: Left arm)   Pulse 95   Temp 98.3  F (36.8  C) (Oral)   Resp 16   Wt 89.9 kg (198 lb 1.6 oz)   LMP 05/18/2005   SpO2 96%   BMI 31.03 kg/m   Tele SR. Had run of SVT overnight. Resolved by bearing down. Up with SBA. Denies pain. NPO. Plan for cardiology consult and echo.

## 2021-11-26 NOTE — PLAN OF CARE
Shirley is alert, oriented, pleasant, slightly anxious. She denies chest pain/pressure, dizziness, nausea, dyspnea. She was in a sinus rhythm this morning. Opted for ablation this afternoon. Neuros intact at baseline. VSS on room air. Probable Lexiscan vs CTA tomorrow before discharge.

## 2021-11-26 NOTE — PHARMACY-ADMISSION MEDICATION HISTORY
Pharmacy Medication History  Admission medication history interview status for the 11/25/2021  admission is complete. See EPIC admission navigator for prior to admission medications     Location of Interview: Patient room  Medication history sources: Patient, Surescripts and Care Everywhere    Significant changes made to the medication list:  Added:  - xiidra  - multivitamin  - vitamin D  - aspirin (once)    In the past week, patient estimated taking medication this percent of the time: greater than 90%    Additional medication history information:   Patient took one dose of 325mg aspirin around 1600 at onset of chest pain otherwise she does not take it regularly.     Medication reconciliation completed by provider prior to medication history? No    Time spent in this activity: 10 minutes    Prior to Admission medications    Medication Sig Last Dose Taking? Auth Provider   aspirin (ASA) 325 MG EC tablet Take 325 mg by mouth once  11/25/2021 at 1600 Yes Unknown, Entered By History   lifitegrast (XIIDRA) 5 % opthalmic solution Place 1 drop into both eyes 2 times daily  Past Week at Unknown time Yes Unknown, Entered By History   Multiple Vitamin (MULTIVITAMIN ADULT PO) Take 1 tablet by mouth daily 11/25/2021 at AM Yes Unknown, Entered By History   Vitamin D3 (CHOLECALCIFEROL) 25 mcg (1000 units) tablet Take 25 mcg by mouth daily 11/25/2021 at AM Yes Unknown, Entered By History       The information provided in this note is only as accurate as the sources available at the time of update(s)

## 2021-11-26 NOTE — PROGRESS NOTES
Patient underwent SVT ablation was found to have a left lateral accessory pathway.  Accessory pathway conduction was eliminated immediately after initial ablation, however conduction through the accessory pathway recurred, which suggested either a second accessory pathway or a slanted accessory pathway.     Additional RF lesions were applied but they were ineffective (likely due to local myocardial edema in the initial ablation).  A decision was made to abort procedure.    Restart metoprolol therapy and plan to redo ablation procedure as an outpatient.    Rayshawn Nicholson MD.

## 2021-11-26 NOTE — H&P
[facility]    History and Physical  Hospitalist       Date of Admission:  11/25/2021    Assessment & Plan   Deepthi Villatoro is a 61 year old healthy female who presents to the Emergency Department for evaluation of jaw pain and palpitations.     SVT  Jaw pain, abnormal EKG   Patient presented with sudden onset left sided jaw pain, palpitations, and chest discomfort. EKG on arrival showed SVT with rate of 219 with ST changes. She converted when IV was placed with subsequent EKG showing sinus tachycardia with significant persistent ST abnormality. Following 5mg IV metoprolol rate improved to 80s and ST changes improved as well. She has no prior heart history or risk factors for CAD outside of obesity and family history. Reports approximately 4 similar episodes (though shorter and less severe) in the past 6 months all at rest. Denies exertional symptoms or decreased exercise tolerance.   Symptom free since converting to sinus rhythm. Troponin <0.015.   --Cardiology consulted, appreciate assistance. Contacted by ED provider and recommended IV heparin.   --continue heparin drip  --trend troponin  --daily asa  --lipid panel, reports A1C checked at physical this month 5.9  --start metoprolol 12.5mg bid  --ECHO   -- NPO at midnight pending cardiology evaluation   --check TSH, mag    Leukocytosis  May be stress margination. No fever, chills, infectious symptoms. CXR benign.   --follow in am     DVT Prophylaxis: heparin  Code Status: Full Code    Disposition: Expected discharge in 2 days pending Cardiology evaluation. Will admit as inpatient     Patient was discussed with Dr. Green who agrees with the above plan     Sheila Eden PA-C    Primary Care Physician   [facility]    Chief Complaint   Jaw pain, palpitations     History is obtained from the patient and her  who is present at bedside    History of Present Illness   Deepthi Villatoro is a 61 year old  healthy female who presents to the Emergency Department for evaluation of jaw pain and palpitations. The patient was in her usual state of health the day of admission.  She bent over to go through a box of [employer] decorations and developed sudden onset left-sided chest pain, palpitations, and subsequently chest discomfort radiating through to her back.  She attempted to take her blood pressure on a home cuff which was unable to get a reading.  Family brought her to the emergency department for evaluation where initial EKG showed SVT with a rate of 219.  EKG also had some anterior lateral ST depression and ST elevations in V1 and aVR.  While her IV was being put in the patient spontaneously converted to sinus tachycardia with EKG showing persistent ST changes which slowed her rate and improved ST abnormalities on EKG.  Cardiology recommended initiating IV heparin.  On-call cardiology was contacted and recommended administration of IV metoprolol with plans for cardiology consultation tomorrow.  The patient is presently evaluated in her room in the emergency room.  She reports she is feeling well at this time and her symptoms have resolved since converting out of SVT.  She denies chest pain, shortness breath, nausea, vomiting, dizziness.  She reports she is pretty active and walks her dog at a brisk pace regularly.  She is not experienced any chest pain, shortness of breath, or palpitations with activity.  She has had no decreased exercise tolerance.  She does report that she has had approximately 4 episodes over the past 6 months of this left-sided jaw discomfort though much less intense and a much shorter duration compared to her present episode.  2 of these episodes have been accompanied by palpitations.  None of them have been exertional.  She otherwise denies sick contacts, fevers, chills, urinary symptoms, cough.    Past Medical History    Past Medical History:   Diagnosis Date     Other facial nerve disorders      Neg neuro eval Sep02 with nearly complete resolution       Past Surgical History   Past Surgical History:   Procedure Laterality Date     [facility] NONSPECIFIC PROCEDURE  2003    Right breast biopsy- benign       Prior to Admission Medications   Prior to Admission Medications   Prescriptions Last Dose Informant Patient Reported? Taking?   Multiple Vitamin (MULTIVITAMIN ADULT PO) 11/25/2021 at AM  Yes Yes   Sig: Take 1 tablet by mouth daily   Vitamin D3 (CHOLECALCIFEROL) 25 mcg (1000 units) tablet 11/25/2021 at AM  Yes Yes   Sig: Take 25 mcg by mouth daily   aspirin (ASA) 325 MG EC tablet 11/25/2021 at 1600  Yes Yes   Sig: Take 325 mg by mouth once    lifitegrast (XIIDRA) 5 % opthalmic solution Past Week at Unknown time  Yes Yes   Sig: Place 1 drop into both eyes 2 times daily       Facility-Administered Medications: None     Allergies   Allergies   Allergen Reactions     Ibuprofen      Possible diarrhea     No Known Allergies        Social History   Lifelong non-smoker. Reports rare alcohol use.     Family History   Father had large MI at age 60. Paternal grandmother with MI in 80s.     Review of Systems   The 10 point Review of Systems is negative other than noted in the HPI or here.     Physical Exam   Temp: 97.6  F (36.4  C) Temp src: Oral BP: 129/76 Pulse: 86   Resp: 9 SpO2: 98 % O2 Device: None (Room air)    Vitals:    11/25/21 1719   Weight: 91.2 kg (201 lb)     Vital Signs with Ranges  Temp:  [97.6  F (36.4  C)] 97.6  F (36.4  C)  Pulse:  [] 86  Resp:  [9-20] 9  BP: (127-151)/(76-79) 129/76  SpO2:  [91 %-98 %] 98 %  No intake/output data recorded.    Constitutional: Alert and oriented. Sitting up in bed. Appears comfortable and is pleasantly conversant   ENT:  moist mucous membranes  Eyes:  Sclera anicteric, EOMI  Respiratory: Lungs clear to auscultation bilaterally, no increased work of breathing  Cardiovascular: Regular rate and rhythm, no murmur, no LE edema, distal pulses +2/4  GI: active bowel sounds,  abdomen soft, non-tender  MSK:  Moves all four extremities. Normal tone   Neuro:  Speech is clear. Face symmetric. Follows commands.       Data   Data reviewed today:  I personally reviewed the EKG tracing showing SVT with rate 219, ST depression inferiorlateral .  Recent Labs   Lab 11/25/21  1731   WBC 13.4*   HGB 14.3   MCV 90      INR 0.94      POTASSIUM 3.5   CHLORIDE 107   CO2 28   BUN 14   CR 0.81   ANIONGAP 5   JOSEFINA 8.7   *   TROPONIN <0.015       Recent Results (from the past 24 hour(s))   XR Chest 2 Views    Narrative    EXAM: XR CHEST 2 VW  LOCATION: [facility]  DATE/TIME: 11/25/2021 5:43 PM    INDICATION: Chest pain  COMPARISON: None.    FINDINGS: The lungs are clear. Normal heart size and pulmonary vascularity. No pleural effusions. No significant osseous pathology.      Impression    IMPRESSION: Negative chest.

## 2021-11-26 NOTE — UTILIZATION REVIEW
Admission Status; Secondary Review Determination       Under the authority of the Utilization Management Committee, the utilization review process indicated a secondary review on the above patient. The review outcome is based on review of the medical records, discussions with staff, and applying clinical experience noted on the date of the review.     (x) Inpatient Status Appropriate - This patient's medical care is consistent with medical management for inpatient care and reasonable inpatient medical practice.     RATIONALE FOR DETERMINATION   61-year-old woman with prior history of palpitation found to have SVT, she is on heparin drip, she had tachycardia with jaw pain and palpitation, heart rate up to 219 concern for non-STEMI associated with her symptoms, she will require ablation and further cardiac management that needs to be done urgent basis in the hospital. The expected length of stay at the time of admission was more than 2 nights because of the severity of illness, intensity of service provided, and risk for adverse outcome. Inpatient admission is appropriate.     This document was produced using voice recognition software       The information on this document is developed by the utilization review team in order for the business office to ensure compliance. This only denotes the appropriateness of proper admission status and does not reflect the quality of care rendered.   The definitions of Inpatient Status and Observation Status used in making the determination above are those provided in the CMS Coverage Manual, Chapter 1 and Chapter 6, section 70.4.   Sincerely,   JAMES CASON MD   System Medical Director   Utilization Management   [facility].

## 2021-11-26 NOTE — PROGRESS NOTES
[facility]    Hospitalist Progress Note      Assessment & Plan   Deepthi Villatoro is a 61 year old female who was admitted on 11/25/2021 with jaw pain and palpitations.    SVT  Jaw pain, abnormal EKG   Type 2 NSTEMI secondary to demand  Patient presented with sudden onset left sided jaw pain, palpitations, and chest discomfort. EKG on arrival showed SVT with rate of 219 with ST changes. She converted when IV was placed with subsequent EKG showing sinus tachycardia with significant persistent ST abnormality. Following 5mg IV metoprolol rate improved to 80s and ST changes improved as well. She has no prior heart history or risk factors for CAD outside of obesity and family history. Reports approximately 4 similar episodes (though shorter and less severe) in the past 6 months all at rest. Denies exertional symptoms or decreased exercise tolerance.   Symptom free since converting to sinus rhythm, had another episode of SVT overnight that resolved with bearing down. Troponin <0.015-->0.841-->1.002--> 0.757-->0.511. A1C is 5.9. Cholesterol total 147 with LDL 80. TSH 2.13.  11/26 Echo: EF 60-65%, no regional WMA. Trace tricuspid and mitral valve regurg.  11/26 Ablation with EP, await final report  --EP appreciated  --metoprolol 25mg bid  --ASA 81mg daily  --statin per cardiology     Leukocytosis  May be stress margination. No fever, chills, infectious symptoms. CXR benign.   -- improving, check again in AM    Clinically Significant Risk Factors Present on Admission              # Platelet Defect: home medication list includes an antiplatelet medication      DVT Prophylaxis: Pneumatic Compression Devices  Code Status: Prior  Expected discharge: 11/27/2021 recommended to prior living arrangement once cardiac work-up completed    Bhavana Landry,   Hospitalist Service  [facility]  Securely message with the Vocera Web Console (learn more here)  Text Page (7am - 6pm) via [employer]  Paging/Directory      Interval History   Patient seen and examined. No chest pain, shortness of breath, nausea, vomiting. Had issue with SVT overnight, but resolved with bearing down. Discussed troponin and likely related to demand given peak at 1 and tachycardia, less likely heart attack. Went for ablation this afternoon.    -Data reviewed today: I reviewed all new labs and imaging results over the last 24 hours. I personally reviewed no images or EKG's today.    Physical Exam   Temp: 98.2  F (36.8  C) Temp src: Oral BP: 110/74 Pulse: 91   Resp: 16 SpO2: 94 % O2 Device: None (Room air)    Vitals:    11/25/21 1719 11/25/21 2116   Weight: 91.2 kg (201 lb) 89.9 kg (198 lb 1.6 oz)     Vital Signs with Ranges  Temp:  [97.6  F (36.4  C)-98.3  F (36.8  C)] 98.2  F (36.8  C)  Pulse:  [] 91  Resp:  [9-20] 16  BP: (109-151)/(48-95) 110/74  SpO2:  [91 %-98 %] 94 %  No intake/output data recorded.    Constitutional: Awake, alert, cooperative, no apparent distress  Respiratory: Clear to auscultation bilaterally, no crackles or wheezing  Cardiovascular: Regular rate and rhythm, normal S1 and S2, and no murmur noted  GI: Normal bowel sounds, soft, non-distended, non-tender  Skin/Integumen: No rashes, no cyanosis, no edema  Other:     Medications       aspirin  81 mg Oral Daily     metoprolol tartrate  25 mg Oral BID       Data   Recent Labs   Lab 11/26/21  0904 11/26/21  0601 11/26/21  0146 11/25/21  2217 11/25/21  1731   WBC  --   --   --  11.1* 13.4*   HGB  --   --   --  12.6 14.3   MCV  --   --   --  89 90   PLT  --   --   --  277 336   INR  --   --   --   --  0.94   NA  --  141  --   --  140   POTASSIUM  --  3.6  --   --  3.5   CHLORIDE  --  109  --   --  107   CO2  --  27  --   --  28   BUN  --  10  --   --  14   CR  --  0.66  --   --  0.81   ANIONGAP  --  5  --   --  5   JOSEFINA  --  8.5  --   --  8.7   GLC  --  108*  --   --  147*   TROPONIN 0.511* 0.757* 1.002* 0.841* <0.015       Recent Results (from the past 24  hour(s))   XR Chest 2 Views    Narrative    EXAM: XR CHEST 2 VW  LOCATION: [facility]  DATE/TIME: 2021 5:43 PM    INDICATION: Chest pain  COMPARISON: None.    FINDINGS: The lungs are clear. Normal heart size and pulmonary vascularity. No pleural effusions. No significant osseous pathology.      Impression    IMPRESSION: Negative chest.   Echocardiogram Complete   Result Value    LVEF  60-65%    Narrative    [id number]  [id number]  [id number]  [id number]^DOMI^ARLENE^JENNIFER     [facility]  Echocardiography Laboratory  [address]  [address]  [place] [address]  Phone [phone]  Fax [phone]     Name: ROB OLIVO  MRN: [id number]  : 1960  Study Date: 2021 09:09 AM  Age: 61 yrs  Gender: Female  Patient Location: [facility]  Reason For Study: Paroxsysmal SVT  Ordering Physician: ARLENE DURON  Referring Physician: ARLENE DURON  Performed By: [facility] Mily Cramer     BSA: 2.0 m2  Height: 67 in  Weight: 198 lb  HR: 77  BP: 109/62 mmHg  ______________________________________________________________________________  Procedure  Complete Portable Echo Adult. Optison (NDC #[id number]) given intravenously.     ______________________________________________________________________________  Interpretation Summary     Left ventricular systolic function is normal.  The visual ejection fraction is 60-65%.  No regional wall motion abnormalities noted.  The right ventricle is normal in size and function.  Trace tricuspid and mitral valve regurgitation.  The inferior vena cava is normal.     There is no comparison study available.  ______________________________________________________________________________  Left Ventricle  The left ventricle is normal in size. There is normal left ventricular wall  thickness. Left ventricular systolic function is normal. The visual ejection  fraction is 60-65%. Grade I or early  diastolic dysfunction. No regional wall  motion abnormalities noted.     Right Ventricle  The right ventricle is normal in size and function.     Atria  Normal left atrial size. Right atrial size is normal. There is no color  Doppler evidence of an atrial shunt.     Mitral Valve  The mitral valve leaflets appear normal. There is no evidence of stenosis,  fluttering, or prolapse. There is trace mitral regurgitation.     Tricuspid Valve  The tricuspid valve is not well visualized, but is grossly normal. There is  trace tricuspid regurgitation. Right ventricular systolic pressure could not  be approximated due to inadequate tricuspid regurgitation.     Aortic Valve  The aortic valve is not well visualized. No aortic regurgitation is present.  No hemodynamically significant valvular aortic stenosis.     Pulmonic Valve  The pulmonic valve is not well visualized. This degree of valvular  regurgitation is within normal limits.     Vessels  The aortic root is normal size. Normal size ascending aorta. The inferior vena  cava is normal.     Pericardium  There is no pericardial effusion.     Rhythm  Sinus rhythm was noted.     ______________________________________________________________________________  MMode/2D Measurements & Calculations  IVSd: 1.0 cm  LVIDd: 4.5 cm  LVIDs: 3.7 cm  LVPWd: 1.2 cm  FS: 18.0 %  LV mass(C)d: 173.1 grams  LV mass(C)dI: 86.0 grams/m2  Ao root diam: 3.2 cm     asc Aorta Diam: 3.6 cm  LVOT diam: 2.2 cm  LVOT area: 3.7 cm2  LA Volume Indexed (AL/bp): 30.8 ml/m2  RWT: 0.52     Doppler Measurements & Calculations  MV E max larry: 67.9 cm/sec  MV A max larry: 90.2 cm/sec  MV E/A: 0.75  MV dec time: 0.23 sec  PA acc time: 0.14 sec  Pulm Sys Larry: 50.3 cm/sec  Pulm Stevens Larry: 37.6 cm/sec  Pulm A Revs Larry: 46.4 cm/sec  Pulm S/D: 1.3     E/E' av.6  Lateral E/e': 6.8  Medial E/e': 10.4     ______________________________________________________________________________  Report approved by: Dr Umang Kay  11/26/2021 01:07 PM

## 2021-11-26 NOTE — ED NOTES
[facility]  ED Nurse Handoff Report    ED Chief complaint: Chest Pain (started one hour ago while putting up [place] lights and had bent over.  pain across left jaw, fast pulse, minor left chest pain.  denies SOB, nausea, dizziness, lightheadedness. )      ED Diagnosis:   Final diagnoses:   Paroxysmal supraventricular tachycardia (H)       Code Status: Full Code    Allergies:   Allergies   Allergen Reactions     Ibuprofen      Possible diarrhea     No Known Allergies        Patient Story: Patient presents to the ED complaining of SVT with a HR in the 220s.      Focused Assessment:  Patient is alert and oriented.  Complaining of jaw pain.  Self converted prior to arrival.  Patient had EKG changes initially but these resolved after her heart rate normalized for a period of time. Patient uses call light appropriately.        Treatments and/or interventions provided: Labs, imaging   Patient's response to treatments and/or interventions:   Labs Ordered and Resulted from Time of ED Arrival to Time of ED Departure   CBC WITH PLATELETS - Abnormal       Result Value    WBC Count 13.4 (*)     RBC Count 4.95      Hemoglobin 14.3      Hematocrit 44.3      MCV 90      MCH 28.9      MCHC 32.3      RDW 13.3      Platelet Count 336     BASIC METABOLIC PANEL - Abnormal    Sodium 140      Potassium 3.5      Chloride 107      Carbon Dioxide (CO2) 28      Anion Gap 5      Urea Nitrogen 14      Creatinine 0.81      Calcium 8.7      Glucose 147 (*)     GFR Estimate 79     TROPONIN I - Normal    Troponin I High Sensitivity 11     TROPONIN I - Normal    Troponin I <0.015     NT PROBNP INPATIENT - Normal    N terminal Pro BNP Inpatient 53     INR - Normal    INR 0.94     PARTIAL THROMBOPLASTIN TIME - Normal    aPTT 30     COVID-19 VIRUS (CORONAVIRUS) BY PCR         To be done/followed up on inpatient unit:      Does this patient have any cognitive concerns?: none    Activity level - Baseline/Home:  Independent  Activity  Level - Current:   Stand with Assist    Patient's Preferred language: English   Needed?: No    Isolation: None  Infection: Not Applicable  Patient tested for COVID 19 prior to admission: YES  Bariatric?: No    Vital Signs:   Vitals:    11/25/21 1719 11/25/21 1730 11/25/21 1800 11/25/21 1830   BP: (!) 151/79  127/76 129/76   Pulse: (!) 225 110 84 86   Resp: 20 13 9    Temp: 97.6  F (36.4  C)      TempSrc: Oral      SpO2: 96% 93% 91% 98%   Weight: 91.2 kg (201 lb)          Cardiac Rhythm:     Was the PSS-3 completed:   Yes  What interventions are required if any?               Family Comments:  at bedside   OBS brochure/video discussed/provided to patient/family: N/A              Name of person given brochure if not patient:               Relationship to patient:     For the majority of the shift this patient's behavior was Green.   Behavioral interventions performed were .    ED NURSE PHONE NUMBER: *[phone]

## 2021-11-26 NOTE — PROGRESS NOTES
RECEIVING UNIT ED HANDOFF REVIEW    ED Nurse Handoff Report was reviewed by: ROB GRIJALVA RN on November 25, 2021 at 8:53 PM

## 2021-11-26 NOTE — CONSULTS
Electrophysiology/ Cardiology- Consult Note         H&P and Plan:     Reason for consult: SVT.      History of present illness: 61-year old lady without significant medical history, who presents with jaw pain palpitations and was found to have SVT.  EP was consulted to help with management.     Patient presents with sudden onset of palpitation/tachycardia associated with jaw pain.  She was brought to the ED for evaluation.  In the ED, an EKG confirmed SVT (short RP tachycardia; heart rate around 219 bpm).  An IV access was obtained and during the placement process the tachycardia terminated.      After termination of tachycardia, subsequent EKG showed sinus tachycardia with ST depressions in the inferolateral leads and possible mild ST elevation in aVR.  A third EKG showed normalization of ST changes.  Patient was a started on heparin drip and troponin peak at 1.     At the moment, she is doing well.  She has no complaints during this visit.  She denies any symptoms of chest pain, shortness of breath, lightheadedness, near-syncope or syncope.      Of note, she had recurrence of SVT last night which lasted for about 40 seconds).  She also informs having brief episodes of SVT in the last month which self terminated.    Plan:  1.  Paroxysmal SVT.  She was very symptomatic with the episodes of SVT.  We discussed management of SVT including pharmacotherapy versus cath ablation procedure.  I explained ablation procedure in details.  She understand there is 1-2% risk complications of the procedure.    After extensive discussion, she would like to have ablation done.  We will make arrangements to have it done today.    2.  Non-STEMI/demand ischemia.  Troponin elevation was likely in the setting of demand ischemia rather than acute plaque rupture.  However due to advanced age, underlying CAD needs to be excluded.  We will start aspirin and statin.  Okay to stop heparin.  Follow-up echo results.  Depend on echo results we  will decide on restratification strategy for CAD.      Rayshawn Nicholson MD    Physical Exam:  Vitals: /62 (BP Location: Left arm)   Pulse 85   Temp 98.1  F (36.7  C) (Oral)   Resp 16   Wt 89.9 kg (198 lb 1.6 oz)   LMP 2005   SpO2 96%   BMI 31.03 kg/m      No intake or output data in the 24 hours ending 21 0924  Vitals:    21 1719 21 2116   Weight: 91.2 kg (201 lb) 89.9 kg (198 lb 1.6 oz)       Constitutional:  AAO x3.  Pt is in NAD.  HEAD: Normocephalic.  SKIN: Skin normal color, texture and turgor with no lesions or eruptions.  Eyes: PERRL, EOMI.  ENT:  Supple, normal JVP. No lymphadenopathy or thyroid enlargement.  Chest:  CTAB.  Cardiac:  RRR, normal  S1 and S2.  No murmurs rubs or gallop.    Abdomen:  Normal BS.  Soft, non-tender and non-distended.  No rebound or guarding.    Extremities:  Pedious pulses palpable B/L.  No LE edema noticed.   Neurological: Strength and sensation grossly symmetric and intact throughout.         Review of Systems:  Complete review of system is otherwise negative with the exception of what was described above.     CURRENT MEDICATIONS:    metoprolol tartrate  12.5 mg Oral BID     sodium chloride (PF)  3 mL Intracatheter Q8H     PRN Meds: acetaminophen **OR** acetaminophen, lidocaine 4%, lidocaine (buffered or not buffered), melatonin, nitroGLYcerin, ondansetron **OR** ondansetron, - MEDICATION INSTRUCTIONS -, sodium chloride (PF)    ALLERGIES     Allergies   Allergen Reactions     Ibuprofen      Possible diarrhea     No Known Allergies        PAST MEDICAL HISTORY:  Past Medical History:   Diagnosis Date     Other facial nerve disorders     Neg neuro eval Sep02 with nearly complete resolution       PAST SURGICAL HISTORY:  Past Surgical History:   Procedure Laterality Date     [facility] NONSPECIFIC PROCEDURE      Right breast biopsy- benign       FAMILY HISTORY:  Family History   Problem Relation Age of Onset     C.A.D. Father          60 heart  attack     Diabetes Mother         type II       SOCIAL HISTORY:  Social History     Socioeconomic History     Marital status:      Spouse name: Not on file     Number of children: Not on file     Years of education: Not on file     Highest education level: Not on file   Occupational History     Not on file   Tobacco Use     Smoking status: Never Smoker     Smokeless tobacco: Never Used   Substance and Sexual Activity     Alcohol use: Yes     Comment: rare     Drug use: No     Sexual activity: Yes     Partners: Male     Birth control/protection: Surgical     Comment: vas   Other Topics Concern     Parent/sibling w/ CABG, MI or angioplasty before 65F 55M? Not Asked   Social History Narrative     Not on file     Social Determinants of Health     Financial Resource Strain: Not on file   Food Insecurity: Not on file   Transportation Needs: Not on file   Physical Activity: Not on file   Stress: Not on file   Social Connections: Not on file   Intimate Partner Violence: Not on file   Housing Stability: Not on file         Recent Lab Results:  Recent Labs   Lab 11/26/21  0601 11/26/21  0146 11/25/21  2217 11/25/21  1731   WBC  --   --  11.1* 13.4*   HGB  --   --  12.6 14.3   MCV  --   --  89 90   PLT  --   --  277 336   INR  --   --   --  0.94     --   --  140   POTASSIUM 3.6  --   --  3.5   CHLORIDE 109  --   --  107   CO2 27  --   --  28   BUN 10  --   --  14   CR 0.66  --   --  0.81   ANIONGAP 5  --   --  5   JOSEFINA 8.5  --   --  8.7   *  --   --  147*   TROPONIN 0.757* 1.002* 0.841* <0.015

## 2021-11-27 VITALS
RESPIRATION RATE: 16 BRPM | WEIGHT: 194.5 LBS | TEMPERATURE: 98.1 F | OXYGEN SATURATION: 94 % | SYSTOLIC BLOOD PRESSURE: 121 MMHG | DIASTOLIC BLOOD PRESSURE: 78 MMHG | BODY MASS INDEX: 30.46 KG/M2 | HEART RATE: 74 BPM

## 2021-11-27 LAB
ACT BLD: 106 SECONDS (ref 74–150)
ACT BLD: 215 SECONDS (ref 74–150)
ACT BLD: 263 SECONDS (ref 74–150)
ANION GAP SERPL CALCULATED.3IONS-SCNC: 3 MMOL/L (ref 3–14)
BUN SERPL-MCNC: 12 MG/DL (ref 7–30)
CALCIUM SERPL-MCNC: 8.5 MG/DL (ref 8.5–10.1)
CHLORIDE BLD-SCNC: 112 MMOL/L (ref 94–109)
CO2 SERPL-SCNC: 28 MMOL/L (ref 20–32)
CREAT SERPL-MCNC: 0.66 MG/DL (ref 0.52–1.04)
ERYTHROCYTE [DISTWIDTH] IN BLOOD BY AUTOMATED COUNT: 13.5 % (ref 10–15)
GFR SERPL CREATININE-BSD FRML MDRD: >90 ML/MIN/1.73M2
GLUCOSE BLD-MCNC: 113 MG/DL (ref 70–99)
HCT VFR BLD AUTO: 39.4 % (ref 35–47)
HGB BLD-MCNC: 12.7 G/DL (ref 11.7–15.7)
MAGNESIUM SERPL-MCNC: 2.2 MG/DL (ref 1.6–2.3)
MCH RBC QN AUTO: 28.5 PG (ref 26.5–33)
MCHC RBC AUTO-ENTMCNC: 32.2 G/DL (ref 31.5–36.5)
MCV RBC AUTO: 89 FL (ref 78–100)
PHOSPHATE SERPL-MCNC: 3.8 MG/DL (ref 2.5–4.5)
PLATELET # BLD AUTO: 268 10E3/UL (ref 150–450)
POTASSIUM BLD-SCNC: 3.8 MMOL/L (ref 3.4–5.3)
RBC # BLD AUTO: 4.45 10E6/UL (ref 3.8–5.2)
SODIUM SERPL-SCNC: 143 MMOL/L (ref 133–144)
WBC # BLD AUTO: 10.9 10E3/UL (ref 4–11)

## 2021-11-27 PROCEDURE — 84100 ASSAY OF PHOSPHORUS: CPT | Performed by: HOSPITALIST

## 2021-11-27 PROCEDURE — 99239 HOSP IP/OBS DSCHRG MGMT >30: CPT | Performed by: HOSPITALIST

## 2021-11-27 PROCEDURE — 250N000013 HC RX MED GY IP 250 OP 250 PS 637: Performed by: INTERNAL MEDICINE

## 2021-11-27 PROCEDURE — 83735 ASSAY OF MAGNESIUM: CPT | Performed by: HOSPITALIST

## 2021-11-27 PROCEDURE — 93005 ELECTROCARDIOGRAM TRACING: CPT

## 2021-11-27 PROCEDURE — 36415 COLL VENOUS BLD VENIPUNCTURE: CPT | Performed by: HOSPITALIST

## 2021-11-27 PROCEDURE — 85027 COMPLETE CBC AUTOMATED: CPT | Performed by: HOSPITALIST

## 2021-11-27 PROCEDURE — 82374 ASSAY BLOOD CARBON DIOXIDE: CPT | Performed by: HOSPITALIST

## 2021-11-27 PROCEDURE — 99233 SBSQ HOSP IP/OBS HIGH 50: CPT | Performed by: INTERNAL MEDICINE

## 2021-11-27 PROCEDURE — 82310 ASSAY OF CALCIUM: CPT | Performed by: HOSPITALIST

## 2021-11-27 RX ORDER — ROSUVASTATIN CALCIUM 10 MG/1
10 TABLET, COATED ORAL DAILY
Status: DISCONTINUED | OUTPATIENT
Start: 2021-11-27 | End: 2021-11-27 | Stop reason: HOSPADM

## 2021-11-27 RX ORDER — METOPROLOL TARTRATE 25 MG/1
25 TABLET, FILM COATED ORAL 2 TIMES DAILY
Qty: 60 TABLET | Refills: 0 | Status: SHIPPED | OUTPATIENT
Start: 2021-11-27 | End: 2021-11-27

## 2021-11-27 RX ORDER — ROSUVASTATIN CALCIUM 10 MG/1
10 TABLET, COATED ORAL DAILY
Qty: 30 TABLET | Refills: 11 | Status: SHIPPED | OUTPATIENT
Start: 2021-11-27 | End: 2021-12-14

## 2021-11-27 RX ORDER — METOPROLOL TARTRATE 50 MG
50 TABLET ORAL 2 TIMES DAILY
Qty: 60 TABLET | Refills: 0 | Status: SHIPPED | OUTPATIENT
Start: 2021-11-27 | End: 2021-12-14

## 2021-11-27 RX ADMIN — METOPROLOL TARTRATE 25 MG: 25 TABLET, FILM COATED ORAL at 07:37

## 2021-11-27 RX ADMIN — ASPIRIN 81 MG: 81 TABLET, COATED ORAL at 07:37

## 2021-11-27 RX ADMIN — ROSUVASTATIN 10 MG: 10 TABLET, FILM COATED ORAL at 11:04

## 2021-11-27 ASSESSMENT — ACTIVITIES OF DAILY LIVING (ADL)
ADLS_ACUITY_SCORE: 4

## 2021-11-27 NOTE — PROGRESS NOTES
Assessment and Plan:     1. Paroxysmal supraventricular tachycardia due to left lateral accessory pathway.  This could not be completely ablated during EP study and will require another procedure to eliminate it.  Heart rate was 219 upon admission and converted spontaneously to sinus rhythm with placement of an IV.  She is now on oral metoprolol tartrate for prevention of SVT and tolerating it well.  Heart rates are still in the 80s and I will increase the dose from 25 mg twice daily to 50 mg twice daily.    2.  Elevated troponin level, likely due to global subendocardial ischemia due to high heart rates, low coronary perfusion pressure, and high left ventricular end-diastolic pressure.  She recently had a coronary calcium score of 0 and I doubt that she has significant underlying coronary artery disease.  However, she did have ST segment depression on her ECG when her heart rate was high and symptoms of left-sided jaw discomfort during SVT, concerning for angina.  Her troponin peaked at 1.0.  Her ECG subsequently normalized and her echocardiogram was normal with no wall motion and normalities.    Plan:    I will increase the metoprolol tartrate from 25 mg p.o. twice daily to 50 mg p.o. twice daily for better prevention of recurrent SVT.    Agree with low-dose aspirin and statin therapy for now until we sort out the issue of possible coronary artery disease and non-STEMI.    We discussed the options of stress testing, but decided to pursue CT coronary angiogram as an outpatient for more definitive evaluation of her coronary artery disease, in case she requires antiarrhythmic drug therapy with flecainide or propafenone.    I will set her up with outpatient EP follow-up to discuss a second electrophysiology procedure for possible definitive ablation of her SVT with Dr. Nicholson.    DENNIS STANLEY MD     45 minutes spent with the patient and her  today explaining the ablation procedure which occurred  yesterday, the results, and plans for future outpatient management.        Interval History:   doing well; no cp, sob, n/v/d, or abd pain. and no recurrent SVT since her ablation and since initiation of metoprolol.          Medications:       aspirin  81 mg Oral Daily     metoprolol tartrate  25 mg Oral BID            Physical Exam:   Blood pressure 121/78, pulse 74, temperature 98.1  F (36.7  C), temperature source Oral, resp. rate 16, weight 88.2 kg (194 lb 8 oz), last menstrual period 05/18/2005, SpO2 94 %.    Vitals:    11/25/21 1719 11/25/21 2116 11/27/21 0629   Weight: 91.2 kg (201 lb) 89.9 kg (198 lb 1.6 oz) 88.2 kg (194 lb 8 oz)         Intake/Output Summary (Last 24 hours) at 11/27/2021 1037  Last data filed at 11/27/2021 0830  Gross per 24 hour   Intake 320 ml   Output --   Net 320 ml       11/22 0700 - 11/27 0659  In: 200 [P.O.:200]  Out: -   Net: 200    Exam:  GENERAL APPEARANCE ADULT: Alert, in no acute distress  RESP: lungs clear to auscultation   CV: regular rate and rhythm, no murmur, rub, or gallop  ABDOMEN: normal bowel sounds, soft, nontender, no hepatosplenomegaly or other masses  EXTREMITIES: No edema         Data:   LABS (Last four results)  CMP  Recent Labs   Lab 11/27/21  0624 11/26/21  0601 11/25/21  1731    141 140   POTASSIUM 3.8 3.6 3.5   CHLORIDE 112* 109 107   CO2 28 27 28   ANIONGAP 3 5 5   * 108* 147*   BUN 12 10 14   CR 0.66 0.66 0.81   GFRESTIMATED >90 >90 79   JOSEFINA 8.5 8.5 8.7   MAG 2.2  --  2.1   PHOS 3.8  --   --      CBC  Recent Labs   Lab 11/27/21  0624 11/25/21  2217 11/25/21  1731   WBC 10.9 11.1* 13.4*   RBC 4.45 4.44 4.95   HGB 12.7 12.6 14.3   HCT 39.4 39.3 44.3   MCV 89 89 90   MCH 28.5 28.4 28.9   MCHC 32.2 32.1 32.3   RDW 13.5 13.2 13.3    277 336     INR  Recent Labs   Lab 11/25/21  1731   INR 0.94     TROPONINS   Lab Results   Component Value Date    TROPONIN 0.511 () 11/26/2021    TROPONIN 0.757 () 11/26/2021    TROPONIN 1.002 () 11/26/2021     TROPONIN 0.841 () 11/25/2021    TROPONIN <0.015 11/25/2021                                                                                                               DENNIS STANLEY MD

## 2021-11-27 NOTE — PROGRESS NOTES
Discharge criteria met. Discharge order received. All discharge instructions reviewed with pt. Pt verbalized understanding. All questions answered. PIV removed. Rx filled and given to pt. All personal belongs returned to pt. Pt left the unit via W/C in stable condition accompanied with staff and .    History  Chief Complaint   Patient presents with    Syncope     patient states "i fainted" this am  denies feeling lightheaded or dizziness  denies chest pain or sob  states "i feel better" unsure if she hit her head  History provided by:  Patient  Syncope   Episode history:  Single  Most recent episode: Today  Duration:  1 minute  Timing:  Intermittent  Progression:  Partially resolved  Chronicity:  Recurrent (had 1 episode when she had the flu in the past and was dehydrated)  Context: normal activity and standing up    Witnessed: no    Relieved by:  Nothing  Worsened by:  Nothing  Ineffective treatments:  None tried  Associated symptoms: malaise/fatigue and nausea    Associated symptoms: no anxiety, no chest pain, no confusion, no diaphoresis, no difficulty breathing, no dizziness, no fever, no focal sensory loss, no headaches, no palpitations, no recent injury, no shortness of breath and no weakness    Risk factors: no coronary artery disease and no seizures        None       History reviewed  No pertinent past medical history  History reviewed  No pertinent surgical history  History reviewed  No pertinent family history  I have reviewed and agree with the history as documented  Social History   Substance Use Topics    Smoking status: Never Smoker    Smokeless tobacco: Not on file    Alcohol use Not on file        Review of Systems   Constitutional: Positive for malaise/fatigue  Negative for diaphoresis and fever  Respiratory: Negative for shortness of breath  Cardiovascular: Positive for syncope  Negative for chest pain and palpitations  Gastrointestinal: Positive for nausea  Neurological: Negative for dizziness, weakness and headaches  Psychiatric/Behavioral: Negative for confusion  All other systems reviewed and are negative  Physical Exam  Physical Exam   Constitutional: She is oriented to person, place, and time  She appears well-developed and well-nourished   No distress  HENT:   Head: Normocephalic and atraumatic  Nose: Nose normal    Mouth/Throat: Oropharynx is clear and moist    Eyes: Pupils are equal, round, and reactive to light  Conjunctivae and EOM are normal    Neck: Normal range of motion  Neck supple  Cardiovascular: Normal rate, regular rhythm, normal heart sounds and intact distal pulses  Pulmonary/Chest: Effort normal and breath sounds normal  No respiratory distress  Abdominal: Soft  Bowel sounds are normal  She exhibits no distension  There is no tenderness  Musculoskeletal: Normal range of motion  She exhibits no edema  Neurological: She is alert and oriented to person, place, and time  No cranial nerve deficit  She exhibits normal muscle tone  Skin: Skin is warm and dry  She is not diaphoretic  No erythema  No pallor  Psychiatric: She has a normal mood and affect  Nursing note and vitals reviewed        Vital Signs  ED Triage Vitals [12/28/18 1235]   Temperature Pulse Respirations Blood Pressure SpO2   98 °F (36 7 °C) 88 16 (!) 194/96 100 %      Temp src Heart Rate Source Patient Position - Orthostatic VS BP Location FiO2 (%)   Oral Monitor Sitting Right arm --      Pain Score       No Pain           Vitals:    12/28/18 1235 12/28/18 1430 12/28/18 1600   BP: (!) 194/96 (!) 127/73 (!) 144/65   Pulse: 88 83 78   Patient Position - Orthostatic VS: Sitting Sitting Sitting       Visual Acuity      ED Medications  Medications   sodium chloride 0 9 % bolus 1,000 mL (0 mL Intravenous Stopped 12/28/18 1607)       Diagnostic Studies  Results Reviewed     Procedure Component Value Units Date/Time    POCT Chem 8+ [[id number]]  (Abnormal) Collected:  12/28/18 1532    Lab Status:  Final result Updated:  12/28/18 1536     SODIUM, I-STAT 139 mmol/l      Potassium, i-STAT 4 1 mmol/L      Chloride, istat 107 mmol/L      CO2, i-STAT 22 mmol/L      Anion Gap, i-STAT 16 (H) mmol/L      Calcium, Ionized i-STAT 1 02 (L) mmol/L      BUN, I-STAT 7 mg/dl Creatinine, i-STAT 0 4 (L) mg/dl      eGFR -- ml/min/1 73sq m      Glucose, i-STAT 89 mg/dl      Hct, i-STAT 31 %      Hgb, i-STAT 10 5 (L) g/dl      Specimen Type VENOUS    UA w Reflex to Microscopic [[id number]] Collected:  12/28/18 1426    Lab Status:  Final result Specimen:  Urine from Urine, Clean Catch Updated:  12/28/18 1438     Color, UA Yellow     Clarity, UA Cloudy     Specific Gravity, UA 1 015     pH, UA 8 0     Leukocytes, UA Negative     Nitrite, UA Negative     Protein, UA Negative mg/dl      Glucose, UA Negative mg/dl      Ketones, UA Negative mg/dl      Urobilinogen, UA 1 0 E U /dl      Bilirubin, UA Negative     Blood, UA Negative    CBC and differential [[id number]] Collected:  12/28/18 1426    Lab Status:  Final result Specimen:  Blood from Arm, Left Updated:  12/28/18 1433     WBC 6 01 Thousand/uL      RBC 4 57 Million/uL      Hemoglobin 12 3 g/dL      Hematocrit 37 7 %      MCV 83 fL      MCH 26 9 pg      MCHC 32 6 g/dL      RDW 13 8 %      MPV 10 5 fL      Platelets 525 Thousands/uL      nRBC 0 /100 WBCs      Neutrophils Relative 59 %      Immat GRANS % 0 %      Lymphocytes Relative 32 %      Monocytes Relative 7 %      Eosinophils Relative 1 %      Basophils Relative 1 %      Neutrophils Absolute 3 55 Thousands/µL      Immature Grans Absolute 0 02 Thousand/uL      Lymphocytes Absolute 1 93 Thousands/µL      Monocytes Absolute 0 42 Thousand/µL      Eosinophils Absolute 0 05 Thousand/µL      Basophils Absolute 0 04 Thousands/µL     POCT pregnancy, urine [[id number]]  (Normal) Resulted:  12/28/18 1431    Lab Status:  Final result Updated:  12/28/18 1431     EXT PREG TEST UR (Ref: Negative) negative                 No orders to display              Procedures  ECG 12 Lead Documentation  Date/Time: 12/28/2018 1:46 PM  Performed by: Neena Nguyễn  Authorized by: Neena Nguyễn     Indications / Diagnosis:  Syncope  ECG reviewed by me, the ED Provider: yes    Patient location:  ED  Rate:     ECG rate:  84    ECG rate assessment: normal    Rhythm:     Rhythm: sinus rhythm    ST segments:     ST segments:  Normal           Phone Contacts  ED Phone Contact    ED Course                               MDM  Number of Diagnoses or Management Options  Syncope: new and requires workup     Amount and/or Complexity of Data Reviewed  Clinical lab tests: ordered and reviewed  Independent visualization of images, tracings, or specimens: yes    Patient Progress  Patient progress: improved    CritCare Time    Disposition  Final diagnoses:   Syncope     Time reflects when diagnosis was documented in both MDM as applicable and the Disposition within this note     Time User Action Codes Description Comment    12/28/2018  3:47 PM Dequan PENNINGTON Add [R55] Syncope       ED Disposition     ED Disposition Condition Comment    Discharge  Chayito Nicholson discharge to home/self care  Condition at discharge: Good        Follow-up Information     Follow up With Specialties Details Why 43 Maritza Smith DO Pediatrics   [address] [address] [facility] [address]  [phone]            There are no discharge medications for this patient  No discharge procedures on file      ED Provider  Electronically Signed by           Julius Garza MD  01/01/19 3039

## 2021-11-27 NOTE — PLAN OF CARE
Alert/Orientated.Stable.Stopcock removed at 2000,  dressing intact on left, Right side reinforced. Education provided. CMS intact. Voiding at bedside commode. VSS. Tele SR. HR stable. Plan repeat ablation in two weeks. Possibly lexiscan, CTA tomorrow? Unclear at this time. Discharge hopefully tomorrow afternoon?

## 2021-11-27 NOTE — PLAN OF CARE
Neuro- A&Ox4  Most Recent Vitals- Temp: 98.4  F (36.9  C) Temp src: Oral BP: 112/69 Pulse: 83   Resp: 16 SpO2: 97 % O2 Device: None (Room air)   Tele/Cardiac- SR  Resp- LS clear on RA  Activity- ind in room  Pain- denies  Drips- none  Drains/Tubes- PIV  Skin- bilateral groin sites CDI, CMS intact  GI/- voiding adequately  Aggression Color- Green  COVID status- Negative  Plan- possible discharge today  Misc-     Aurora Davila, RN

## 2021-11-27 NOTE — DISCHARGE SUMMARY
[facility]    Discharge Summary  Hospitalist    Date of Admission:  11/25/2021  Date of Discharge:  11/27/2021  Discharging Provider: Bhavana Landry DO  Date of Service (when I saw the patient): 11/27/21    Discharge Diagnoses   SVT s/p ablation, requires additional ablation  Abnormal EKG  Type 2 NSTEMI secondary to demand  Leukocytosis secondary to stress demargination, resolved    History of Present Illness   Deepthi Villatoro is an 61 year old female who presented with jaw pain and palpitations, found to be in SVT. She underwent ablation to one pathway, but will require additional ablation for accessory pathway in a few weeks.    Hospital Course   Deepthi Villatoro was admitted on 11/25/2021.  The following problems were addressed during her hospitalization:    SVT  Jaw pain, abnormal EKG   Type 2 NSTEMI secondary to demand  Patient presented with sudden onset left sided jaw pain, palpitations, and chest discomfort. EKG on arrival showed SVT with rate of 219 with ST changes. She converted when IV was placed with subsequent EKG showing sinus tachycardia with significant persistent ST abnormality. Following 5mg IV metoprolol rate improved to 80s and ST changes improved as well. She has no prior heart history or risk factors for CAD outside of obesity and family history. Reports approximately 4 similar episodes (though shorter and less severe) in the past 6 months all at rest. Denies exertional symptoms or decreased exercise tolerance.   Symptom free since converting to sinus rhythm, had another episode of SVT overnight that resolved with bearing down. Troponin <0.015-->0.841-->1.002--> 0.757-->0.511. A1C is 5.9. Cholesterol total 147 with LDL 80. TSH 2.13.  11/26 Echo: EF 60-65%, no regional WMA. Trace tricuspid and mitral valve regurg.  11/26 Ablation to one pathway, but accesory pathway unable to be eliminated due to edema  --EP appreciated, follow up in few weeks with Dr Nicholson for repeat  ablation procedure and follow up in cardiology clinic in one month  -- no strenuous activity for 5 days  --metoprolol 50mg bid  --ASA 81mg daily (until ruled out CAD)  --Rosuvastatin 10mg daily (until ruled out CAD)  -- follow up outpatient for CTA coronary to evaluate for coronary artery disease  --follow up with PCP in one week, can be via phone for hospital follow-up     Leukocytosis secondary to stress demargination  Secondary tostress margination. No fever, chills, infectious symptoms. CXR benign. WBC back down to 10.9 on 11/27    Bhavana Landry, DO    Significant Results and Procedures   11/26: ablated one pathway in high lateral area of left atrium. Accessory pathway ablated, but then recurred. Also had further ablation at 4 o'clock and 1 o'clock positions, but could not eliminate accessory pathway.    Pending Results   NA    Code Status   Full Code       Primary Care Physician   [facility]    Physical Exam   Temp: 98.1  F (36.7  C) Temp src: Oral BP: 121/78 Pulse: 74   Resp: 16 SpO2: 94 % O2 Device: None (Room air)    Vitals:    11/25/21 1719 11/25/21 2116 11/27/21 0629   Weight: 91.2 kg (201 lb) 89.9 kg (198 lb 1.6 oz) 88.2 kg (194 lb 8 oz)     Vital Signs with Ranges  Temp:  [98.1  F (36.7  C)-99.1  F (37.3  C)] 98.1  F (36.7  C)  Pulse:  [74-91] 74  Resp:  [16] 16  BP: (110-121)/(54-78) 121/78  SpO2:  [94 %-97 %] 94 %  I/O last 3 completed shifts:  In: 200 [P.O.:200]  Out: -     Patient seen and examined. She is doing well this morning, no further episodes of SVT. She reports not being surprised that things were not entirely routine yesterday during her ablation and is prepared to return in a few weeks for additional ablation procedure. She reports some discomfort during procedure, but this has resolved. We discussed her blood sugar values and I reassured that these are normal in setting of stress and are not elevated enough to be diagnostic of diabetes either. No chest pain,  shortness of breath, nausea, vomiting. She is stable for discharge to home.    Constitutional: Awake, alert, cooperative, no apparent distress.  Eyes: Conjunctiva and pupils examined and normal.  HEENT: Moist mucous membranes, normal dentition.  Respiratory: Clear to auscultation bilaterally, no crackles or wheezing.  Cardiovascular: Regular rate and rhythm, normal S1 and S2, and no murmur noted.  GI: Soft, non-distended, non-tender, normal bowel sounds.  Lymph/Hematologic: No anterior cervical or supraclavicular adenopathy.  Skin: No rashes, no cyanosis, no edema. Dressings at right and left groin sites.  Musculoskeletal: No joint swelling, erythema or tenderness.  Neurologic: Cranial nerves 2-12 intact, normal strength and sensation.  Psychiatric: Alert, oriented to person, place and time, no obvious anxiety or depression.    Discharge Disposition   Discharged to home  Condition at discharge: Stable    Consultations This Hospital Stay   PHARMACY IP CONSULT  PHARMACY IP CONSULT  CARDIOLOGY IP CONSULT  PHARMACY IP CONSULT  PHARMACY IP CONSULT    Time Spent on this Encounter   I, Bhavana Landry DO, personally saw the patient today and spent greater than 30 minutes discharging this patient.    Discharge Orders      Follow-Up with Electrophysiologist      Reason for your hospital stay    SVT (very fast heart rate, supraventricular tachycardia) requiring ablation for treatment     Follow-up and recommended labs and tests     Follow up with primary care provider, [facility], within 7 days for hospital follow- up.  No follow up labs or test are needed.    Follow up with electrophysiology, Dr Nicholson within next month for repeat ablation     Activity    Your activity upon discharge: activity as tolerated, no strenuous activity for 5 days     Discharge Instructions    1. Take 81mg aspirin once daily instead of 325mg daily.    2. Start taking metoprolol tartrate 50mg twice daily to control your  heart rate. Your cardiologist will instruct you when to stop taking it prior to the procedure.    3. Start taking crestor 10mg for cholesterol     Diet    Follow this diet upon discharge: Regular Diet Adult     CTA Angiogram coronary artery     Discharge Medications   Current Discharge Medication List      START taking these medications    Details   metoprolol tartrate (LOPRESSOR) 50 MG tablet Take 1 tablet (50 mg) by mouth 2 times daily  Qty: 60 tablet, Refills: 0    Comments: Future refills by PCP Dr. Pemberton [facility] with phone number [phone].  Associated Diagnoses: Paroxysmal supraventricular tachycardia (H)      rosuvastatin (CRESTOR) 10 MG tablet Take 1 tablet (10 mg) by mouth daily  Qty: 30 tablet, Refills: 11    Associated Diagnoses: NSTEMI (non-ST elevated myocardial infarction) (H)         CONTINUE these medications which have CHANGED    Details   aspirin (ASA) 81 MG EC tablet Take 1 tablet (81 mg) by mouth daily  Qty: 30 tablet, Refills: 0    Comments: Future refills by PCP Dr. Pemberton [facility] with phone number [phone].  Associated Diagnoses: Paroxysmal supraventricular tachycardia (H)         CONTINUE these medications which have NOT CHANGED    Details   lifitegrast (XIIDRA) 5 % opthalmic solution Place 1 drop into both eyes 2 times daily       Multiple Vitamin (MULTIVITAMIN ADULT PO) Take 1 tablet by mouth daily      Vitamin D3 (CHOLECALCIFEROL) 25 mcg (1000 units) tablet Take 25 mcg by mouth daily           Allergies   Allergies   Allergen Reactions     Ibuprofen      Possible diarrhea     No Known Allergies      Data   Most Recent 3 CBC's:  Recent Labs   Lab Test 11/27/21  0624 11/25/21  2217 11/25/21  1731   WBC 10.9 11.1* 13.4*   HGB 12.7 12.6 14.3   MCV 89 89 90    277 336      Most Recent 3 BMP's:  Recent Labs   Lab Test 11/27/21  0624 11/26/21  0601 11/25/21  1731    141 140   POTASSIUM 3.8 3.6 3.5   CHLORIDE 112* 109 107   CO2 28  27 28   BUN 12 10 14   CR 0.66 0.66 0.81   ANIONGAP 3 5 5   JOSEFINA 8.5 8.5 8.7   * 108* 147*     Most Recent 2 LFT's:No lab results found.  Most Recent INR's and Anticoagulation Dosing History:  Anticoagulation Dose History     Recent Dosing and Labs Latest Ref Rng & Units 2021    INR 0.85 - 1.15 0.94        Most Recent 3 Troponin's:  Recent Labs   Lab Test 21  0904 21  0601 21  0146   TROPONIN 0.511* 0.757* 1.002*     Most Recent Cholesterol Panel:  Recent Labs   Lab Test 21  0601   CHOL 147   LDL 80   HDL 48*   TRIG 95     Most Recent 6 Bacteria Isolates From Any Culture (See EPIC Reports for Culture Details):No lab results found.  Most Recent TSH, T4 and A1c Labs:  Recent Labs   Lab Test 21  1731   TSH 2.13     Results for orders placed or performed during the hospital encounter of 21   XR Chest 2 Views    Narrative    EXAM: XR CHEST 2 VW  LOCATION: [facility]  DATE/TIME: 2021 5:43 PM    INDICATION: Chest pain  COMPARISON: None.    FINDINGS: The lungs are clear. Normal heart size and pulmonary vascularity. No pleural effusions. No significant osseous pathology.      Impression    IMPRESSION: Negative chest.   Echocardiogram Complete     Value    LVEF  60-65%    Narrative    [id number]  [id number]  [id number]  [id number]^DOMI^ARLENE^JENNIFER     [facility]  Echocardiography Laboratory  [address]  [address]  [place] [address]  Phone [phone]  Fax [phone]     Name: ROB OLIVO  MRN: [id number]  : 1960  Study Date: 2021 09:09 AM  Age: 61 yrs  Gender: Female  Patient [facility]  Reason For Study: Paroxsysmal SVT  Ordering Physician: ARLENE DURON  Referring Physician: ARLENE DURON  Performed By: [facility] Mily Cramer     BSA: 2.0 m2  Height: 67 in  Weight: 198 lb  HR: 77  BP: 109/62  mmHg  ______________________________________________________________________________  Procedure  Complete Portable Echo Adult. Optison (NDC #[id number]) given intravenously.     ______________________________________________________________________________  Interpretation Summary     Left ventricular systolic function is normal.  The visual ejection fraction is 60-65%.  No regional wall motion abnormalities noted.  The right ventricle is normal in size and function.  Trace tricuspid and mitral valve regurgitation.  The inferior vena cava is normal.     There is no comparison study available.  ______________________________________________________________________________  Left Ventricle  The left ventricle is normal in size. There is normal left ventricular wall  thickness. Left ventricular systolic function is normal. The visual ejection  fraction is 60-65%. Grade I or early diastolic dysfunction. No regional wall  motion abnormalities noted.     Right Ventricle  The right ventricle is normal in size and function.     Atria  Normal left atrial size. Right atrial size is normal. There is no color  Doppler evidence of an atrial shunt.     Mitral Valve  The mitral valve leaflets appear normal. There is no evidence of stenosis,  fluttering, or prolapse. There is trace mitral regurgitation.     Tricuspid Valve  The tricuspid valve is not well visualized, but is grossly normal. There is  trace tricuspid regurgitation. Right ventricular systolic pressure could not  be approximated due to inadequate tricuspid regurgitation.     Aortic Valve  The aortic valve is not well visualized. No aortic regurgitation is present.  No hemodynamically significant valvular aortic stenosis.     Pulmonic Valve  The pulmonic valve is not well visualized. This degree of valvular  regurgitation is within normal limits.     Vessels  The aortic root is normal size. Normal size ascending aorta. The inferior vena  cava is normal.      Pericardium  There is no pericardial effusion.     Rhythm  Sinus rhythm was noted.     ______________________________________________________________________________  MMode/2D Measurements & Calculations  IVSd: 1.0 cm  LVIDd: 4.5 cm  LVIDs: 3.7 cm  LVPWd: 1.2 cm  FS: 18.0 %  LV mass(C)d: 173.1 grams  LV mass(C)dI: 86.0 grams/m2  Ao root diam: 3.2 cm     asc Aorta Diam: 3.6 cm  LVOT diam: 2.2 cm  LVOT area: 3.7 cm2  LA Volume Indexed (AL/bp): 30.8 ml/m2  RWT: 0.52     Doppler Measurements & Calculations  MV E max larry: 67.9 cm/sec  MV A max larry: 90.2 cm/sec  MV E/A: 0.75  MV dec time: 0.23 sec  PA acc time: 0.14 sec  Pulm Sys Larry: 50.3 cm/sec  Pulm Stevens Larry: 37.6 cm/sec  Pulm A Revs Larry: 46.4 cm/sec  Pulm S/D: 1.3     E/E' av.6  Lateral E/e': 6.8  Medial E/e': 10.4     ______________________________________________________________________________  Report approved by: Dr Umang Kay 2021 01:07 PM

## 2021-11-29 ENCOUNTER — TELEPHONE (OUTPATIENT)
Dept: CARDIOLOGY | Facility: CLINIC | Age: 61
End: 2021-11-29
Payer: COMMERCIAL

## 2021-11-29 NOTE — TELEPHONE ENCOUNTER
11/29/21 Msg recd from Dr Nicholson  Patient had an attempt of SVT ablation which was unsuccessful.  She will need to follow-up in clinic in a week and possibly redo ablation as an outpatient with general anesthesia.   Called pt and scheduled her for 12/10 at 330 with Roopa Dickerson NP. Given Afib RN line for questions or issues prior.  Pt voiced understanding and agreement with plan.   Corey 227 pm

## 2021-11-29 NOTE — TELEPHONE ENCOUNTER
Patient was evaluated by cardiology while inpatient for jaw pain with palpitations. Found to be in SVT with rates 219 bpm. Elevated troponin with ST depression on her EKG-probable type II NSTEMI secondary to elevated HR. 11/26/21: SVT ablation via RFV and was found to have a left lateral accessory pathway. Accessory pathway conduction was eliminated immediately after initial ablation, however conduction through the accessory pathway recurred, which suggested either a second accessory pathway or a slanted accessory pathway. She underwent ablation to one pathway, but will require additional ablation for accessory pathway in a few weeks. Pt recently had a coronary calcium score of 0, so Dr. Vance doubtful that she has significant underlying coronary artery disease, but in presence of EKG changes and elevated troponin, decision made to pursue OP CTA to rule out. Pt was started on Metoprolol and Crestor at time of discharge. Called patient to discuss any post hospital d/c questions she may have, review medication changes, and confirm f/u appts. Patient denied any questions regarding new medications or changes to PTA medications. Patient denied any SOB, chest pain, palpitations or light headedness. RN confirmed with patient that she has an OV scheduled on 12/10/21 at 1530 with VICTORINO Roopa Dickerson at our [place] Office. RN confirmed with pt that she still needs to be scheduled for an OP CTA. Patient advised to call clinic with any cardiac related questions or concerns prior to this victorino't. Patient verbalized understanding and agreed with plan. Phone call transferred to scheduling.  JENNY Peralta RN.

## 2021-11-30 LAB
ATRIAL RATE - MUSE: 81 BPM
DIASTOLIC BLOOD PRESSURE - MUSE: NORMAL MMHG
INTERPRETATION ECG - MUSE: NORMAL
P AXIS - MUSE: 13 DEGREES
PR INTERVAL - MUSE: 140 MS
QRS DURATION - MUSE: 86 MS
QT - MUSE: 382 MS
QTC - MUSE: 443 MS
R AXIS - MUSE: -3 DEGREES
SYSTOLIC BLOOD PRESSURE - MUSE: NORMAL MMHG
T AXIS - MUSE: -3 DEGREES
VENTRICULAR RATE- MUSE: 81 BPM

## 2021-12-10 ENCOUNTER — OFFICE VISIT (OUTPATIENT)
Dept: CARDIOLOGY | Facility: CLINIC | Age: 61
End: 2021-12-10
Payer: COMMERCIAL

## 2021-12-10 VITALS
BODY MASS INDEX: 31.28 KG/M2 | OXYGEN SATURATION: 95 % | HEART RATE: 91 BPM | DIASTOLIC BLOOD PRESSURE: 70 MMHG | SYSTOLIC BLOOD PRESSURE: 122 MMHG | WEIGHT: 199.3 LBS | HEIGHT: 67 IN

## 2021-12-10 DIAGNOSIS — I47.10 PAROXYSMAL SUPRAVENTRICULAR TACHYCARDIA (H): Primary | ICD-10-CM

## 2021-12-10 DIAGNOSIS — I45.6 ACCESSORY ATRIOVENTRICULAR PATHWAY: ICD-10-CM

## 2021-12-10 PROCEDURE — 93000 ELECTROCARDIOGRAM COMPLETE: CPT | Performed by: NURSE PRACTITIONER

## 2021-12-10 PROCEDURE — 99214 OFFICE O/P EST MOD 30 MIN: CPT | Performed by: NURSE PRACTITIONER

## 2021-12-10 ASSESSMENT — MIFFLIN-ST. JEOR: SCORE: 1506.65

## 2021-12-10 NOTE — PATIENT INSTRUCTIONS
I will discuss with Dr. Nicholson the next step of pursing an ablation under conscious sedation vs general anesthia and will let you know via my chart.   I will then have scheduling call you.          If you have problems or questions please call the RNs (Lora [facility] & Nelly) at [phone]

## 2021-12-10 NOTE — LETTER
12/10/2021    [facility]  [address], [address]  [facility] [address]    RE: Deepthi Villatoro       Dear Colleague,     I had the pleasure of seeing Deepthi Villatoro in the [facility].      Cardiology Clinic Progress Note    Service Date: 12/10/21    Primary Cardiologist: Dr. Nicholson      Reason for Visit: after undergoing an SVT ablation     HPI:   I had the pleasure of seeing Ms. Deepthi Villatoro in the clinic today and she is a very pleasant 61 year old female with a past medical history notable for    1. Paroxysmal supraventricular tachycardia       Diagnostics  Echo (11/2021)  Left ventricular systolic function is normal. The visual ejection fraction is 60-65%. No regional wall motion abnormalities noted. The right ventricle is normal in size and function. Trace tricuspid and mitral valve regurgitation. The inferior vena cava is normal.  There is no comparison study available.    In November 2021, patient was admitted to the hospital she was found to have supraventricular tachycardia.  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  Her troponin level was elevated likely due to global subendocardial ischemia due to high heart rates.  She has a history of calcium score of 0 however she did have an ST segment depression on her ECG when her heart rates were high and symptoms of left sided jaw pain during SVT.  This was concerning for angina her troponin peaked at 1.0.  Her echo showed no wall motion abnormalities.  At discharge with oral increase to 50 mg twice daily and she was started on statin and aspirin until she could pursue a CT angiogram.    Today, she presents with her .  She is feeling well and denies chest pain, shortness of breath, dyspnea on exertion, orthopnea, PND, lower extremity edema, palpitations, dizziness, presyncope, or syncope.   Reports taking  medications as prescribed but would prefer to not be on a statin or aspirin.  In addition her goal would be to discontinue metoprolol therefore she is considering pursuing a redo SVT ablation to ablate the accessory pathway however this needs to be completed under general anesthesia.    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  Dr. Nicholson recommended another ablation under general anesthesia to ablate the accessory pathway.    Prior to ablation patient should hold the Toprol tartrate 50 mg twice daily    We discussed the risks, benefits and indications of proceeding with electrophysiology study and possible ablation, including but not limited to the use of conscious sedation and need for a  upon discharge, peripheral vessel injury and discomfort, heart attack, death, stroke, cardiac puncture and/or tamponade, pneumothorax, [id number]-arrhythmias requiring further treatment and damage to existing electrical structures that may require permanent pacemaker implantation. We reviewed that additional procedures may be required. We briefly discussed post-procedural restrictions and post-procedural discomfort.  She and her  voiced understanding and is willing to proceed.   Consent will be signed by the procedural physician.       Elevated troponin    History of calcium score 0    Did have ST segment depression on her ECG when heart rate was elevated along with left-sided jaw pain during SVT.  The troponin was 1.0    Echo normal with no wall motion abnormalities    LDL 80 (11/2021)    At discharge from hospital was started on aspirin and statin therapy.  Patient would prefer not to take these medications.  Her ASCVD 10-year risk is 2.6% therefore she elected to discontinue the medications    We will have scheduling to order CTA angiogram coronary artery    We will obtain CT scans  from [facility] as her primary care provider is Bhupendra Phan.         Plan:    Per patient preference discontinue statin and aspirin     scheduling to help schedule CT angiogram coronary artery and SVT ablation with general anesthesia hold metoprolol prior to ablation     Obtain information from [facility] [place]    please call the clinic if questions or problems arise      Thank you for the opportunity to participate in this pleasant patient's care. We would be happy to see her sooner if needed for any concerns in the meantime.         JANETH Hancock CNP  [facility]  Text Page  (M-F 8:00 am - 4:30 pm)    Orders this Visit:  Orders Placed This Encounter   Procedures     EKG 12-lead complete w/read - Clinics (performed today)     Orders Placed This Encounter   Medications     metoprolol tartrate (LOPRESSOR) 50 MG tablet     Sig: Take 1 tablet (50 mg) by mouth 2 times daily     Dispense:  180 tablet     Refill:  3     Medications Discontinued During This Encounter   Medication Reason     rosuvastatin (CRESTOR) 10 MG tablet      aspirin (ASA) 81 MG EC tablet Stopped by Patient     metoprolol tartrate (LOPRESSOR) 50 MG tablet Reorder     Encounter Diagnosis   Name Primary?     Paroxysmal supraventricular tachycardia (H)        CURRENT MEDICATIONS:  Current Outpatient Medications   Medication Sig Dispense Refill     lifitegrast (XIIDRA) 5 % opthalmic solution Place 1 drop into both eyes 2 times daily        metoprolol tartrate (LOPRESSOR) 50 MG tablet Take 1 tablet (50 mg) by mouth 2 times daily 180 tablet 3     Multiple Vitamin (MULTIVITAMIN ADULT PO) Take 1 tablet by mouth daily       Vitamin D3 (CHOLECALCIFEROL) 25 mcg (1000 units) tablet Take 25 mcg by mouth daily         ALLERGIES  Allergies   Allergen Reactions     Ibuprofen      Possible diarrhea     No Known Allergies        PAST MEDICAL, SURGICAL, SOCIAL FAMILY HISTORY:  History was reviewed and updated as needed, see medical  "record.    Review of Systems:  Skin:  Positive for rash   Eyes:  Positive for glasses;contacts  ENT:  Negative    Respiratory:  Negative    Cardiovascular:  Negative    Gastroenterology: Negative    Genitourinary:  Negative    Musculoskeletal:  Negative    Neurologic:  Negative    Psychiatric:  Negative    Heme/Lymph/Imm:  Positive for allergies  Endocrine:  Negative       Physical Exam:  Vitals: /70   Pulse 91   Ht 1.702 m (5' 7\")   Wt 90.4 kg (199 lb 4.8 oz)   LMP 05/18/2005   SpO2 95%   BMI 31.21 kg/m     Wt Readings from Last 4 Encounters:   12/10/21 90.4 kg (199 lb 4.8 oz)   11/27/21 88.2 kg (194 lb 8 oz)   12/19/20 79.4 kg (175 lb)   11/25/14 86.6 kg (191 lb)     CONSTITUTIONAL: Appears her stated age, well nourished, and in no acute distress.  HEENT: Pupils equal, round. Sclerae nonicteric.    NECK: Supple, no masses appreciated.   C/V:  Regular rate and rhythm, normal S1 and S2, no S3 or S4, no murmur, rub or gallop.   RESP: Respirations are unlabored. Lungs are clear to auscultation bilaterally without wheezing, rales, or rhonchi.  GI: Abdomen soft, non-tender, non-distended.  EXTREM:  No clubbing, cyanosis, or lower extremity edema bilaterally.   NEURO: Alert and oriented, cooperative. Gait steady. No gross focal deficits.   PSYCH: Affect appropriate. Mentation normal. Responds to questions appropriately.  SKIN: Warm and dry. No apparent rashes or bruising.     Recent Lab Results:  LIPID RESULTS:  Lab Results   Component Value Date    CHOL 147 11/26/2021    CHOL 148 05/24/2004    HDL 48 (L) 11/26/2021    HDL 45 05/24/2004    LDL 80 11/26/2021    LDL 82 05/24/2004    TRIG 95 11/26/2021    TRIG 102 05/24/2004    CHOLHDLRATIO 3.3 05/24/2004     LIVER ENZYME RESULTS:  Lab Results   Component Value Date    AST 26 05/24/2004    ALT 15 05/24/2004     CBC RESULTS:  Lab Results   Component Value Date    WBC 10.9 11/27/2021    RBC 4.45 11/27/2021    HGB 12.7 11/27/2021    HGB 13.9 05/14/2003    HCT 39.4 " 11/27/2021    MCV 89 11/27/2021    MCH 28.5 11/27/2021    MCHC 32.2 11/27/2021    RDW 13.5 11/27/2021     11/27/2021     BMP RESULTS:  Lab Results   Component Value Date     11/27/2021     05/24/2004    POTASSIUM 3.8 11/27/2021    POTASSIUM 3.7 05/24/2004    CHLORIDE 112 (H) 11/27/2021    CHLORIDE 106 05/24/2004    CO2 28 11/27/2021    CO2 27 05/24/2004    ANIONGAP 3 11/27/2021    ANIONGAP 9 05/24/2004     (H) 11/27/2021    GLC 98 05/26/2005    BUN 12 11/27/2021    BUN 10 05/24/2004    CR 0.66 11/27/2021    CR 0.70 05/24/2004    GFRESTIMATED >90 11/27/2021    GFRESTIMATED >80 05/24/2004    GFRESTBLACK >80 05/24/2004    JOSEFINA 8.5 11/27/2021    JOSEFINA 8.9 05/24/2004      CC  Taurus Vance MD  [id number]  [place] [address]    This note was completed in part using Dragon voice recognition software. Although reviewed after completion, some word and grammatical errors may occur.      Thank you for allowing me to participate in the care of your patient.      Sincerely,     JANETH Hancock [facility]

## 2021-12-10 NOTE — PROGRESS NOTES
Cardiology Clinic Progress Note    Service Date: 12/10/21    Primary Cardiologist: Dr. Nicholson      Reason for Visit: after undergoing an SVT ablation     HPI:   I had the pleasure of seeing Ms. Deepthi Villatoro in the clinic today and she is a very pleasant 61 year old female with a past medical history notable for    1. Paroxysmal supraventricular tachycardia       Diagnostics  Echo (11/2021)  Left ventricular systolic function is normal. The visual ejection fraction is 60-65%. No regional wall motion abnormalities noted. The right ventricle is normal in size and function. Trace tricuspid and mitral valve regurgitation. The inferior vena cava is normal.  There is no comparison study available.    In November 2021, patient was admitted to the hospital she was found to have supraventricular tachycardia.  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  Her troponin level was elevated likely due to global subendocardial ischemia due to high heart rates.  She has a history of calcium score of 0 however she did have an ST segment depression on her ECG when her heart rates were high and symptoms of left sided jaw pain during SVT.  This was concerning for angina her troponin peaked at 1.0.  Her echo showed no wall motion abnormalities.  At discharge with oral increase to 50 mg twice daily and she was started on statin and aspirin until she could pursue a CT angiogram.    Today, she presents with her .  She is feeling well and denies chest pain, shortness of breath, dyspnea on exertion, orthopnea, PND, lower extremity edema, palpitations, dizziness, presyncope, or syncope.   Reports taking medications as prescribed but would prefer to not be on a statin or aspirin.  In addition her goal would be to discontinue metoprolol therefore she is considering pursuing a redo SVT ablation to ablate the accessory pathway however this  needs to be completed under general anesthesia.    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  Dr. Nicholson recommended another ablation under general anesthesia to ablate the accessory pathway.    Prior to ablation patient should hold the Toprol tartrate 50 mg twice daily    We discussed the risks, benefits and indications of proceeding with electrophysiology study and possible ablation, including but not limited to the use of conscious sedation and need for a  upon discharge, peripheral vessel injury and discomfort, heart attack, death, stroke, cardiac puncture and/or tamponade, pneumothorax, [id number]-arrhythmias requiring further treatment and damage to existing electrical structures that may require permanent pacemaker implantation. We reviewed that additional procedures may be required. We briefly discussed post-procedural restrictions and post-procedural discomfort.  She and her  voiced understanding and is willing to proceed.   Consent will be signed by the procedural physician.       Elevated troponin    History of calcium score 0    Did have ST segment depression on her ECG when heart rate was elevated along with left-sided jaw pain during SVT.  The troponin was 1.0    Echo normal with no wall motion abnormalities    LDL 80 (11/2021)    At discharge from hospital was started on aspirin and statin therapy.  Patient would prefer not to take these medications.  Her ASCVD 10-year risk is 2.6% therefore she elected to discontinue the medications    We will have scheduling to order CTA angiogram coronary artery    [facility] as her primary care provider is Bhupendra Phan.         Plan:    Per patient preference discontinue statin and aspirin     scheduling to help schedule CT angiogram coronary artery and SVT ablation with general  anesthesia hold metoprolol prior to ablation     Obtain information from [facility]    please call the clinic if questions or problems arise      Thank you for the opportunity to participate in this pleasant patient's care. We would be happy to see her sooner if needed for any concerns in the meantime.         JANETH Hancock CNP  [facility]  Text Page  (M-F 8:00 am - 4:30 pm)    Orders this Visit:  Orders Placed This Encounter   Procedures     EKG 12-lead complete w/read - Clinics (performed today)     Orders Placed This Encounter   Medications     metoprolol tartrate (LOPRESSOR) 50 MG tablet     Sig: Take 1 tablet (50 mg) by mouth 2 times daily     Dispense:  180 tablet     Refill:  3     Medications Discontinued During This Encounter   Medication Reason     rosuvastatin (CRESTOR) 10 MG tablet      aspirin (ASA) 81 MG EC tablet Stopped by Patient     metoprolol tartrate (LOPRESSOR) 50 MG tablet Reorder     Encounter Diagnosis   Name Primary?     Paroxysmal supraventricular tachycardia (H)        CURRENT MEDICATIONS:  Current Outpatient Medications   Medication Sig Dispense Refill     lifitegrast (XIIDRA) 5 % opthalmic solution Place 1 drop into both eyes 2 times daily        metoprolol tartrate (LOPRESSOR) 50 MG tablet Take 1 tablet (50 mg) by mouth 2 times daily 180 tablet 3     Multiple Vitamin (MULTIVITAMIN ADULT PO) Take 1 tablet by mouth daily       Vitamin D3 (CHOLECALCIFEROL) 25 mcg (1000 units) tablet Take 25 mcg by mouth daily         ALLERGIES  Allergies   Allergen Reactions     Ibuprofen      Possible diarrhea     No Known Allergies        PAST MEDICAL, SURGICAL, SOCIAL FAMILY HISTORY:  History was reviewed and updated as needed, see medical record.    Review of Systems:  Skin:  Positive for rash   Eyes:  Positive for glasses;contacts  ENT:  Negative    Respiratory:  Negative    Cardiovascular:  Negative    Gastroenterology: Negative    Genitourinary:  Negative    Musculoskeletal:   "Negative    Neurologic:  Negative    Psychiatric:  Negative    Heme/Lymph/Imm:  Positive for allergies  Endocrine:  Negative       Physical Exam:  Vitals: /70   Pulse 91   Ht 1.702 m (5' 7\")   Wt 90.4 kg (199 lb 4.8 oz)   LMP 05/18/2005   SpO2 95%   BMI 31.21 kg/m     Wt Readings from Last 4 Encounters:   12/10/21 90.4 kg (199 lb 4.8 oz)   11/27/21 88.2 kg (194 lb 8 oz)   12/19/20 79.4 kg (175 lb)   11/25/14 86.6 kg (191 lb)     CONSTITUTIONAL: Appears her stated age, well nourished, and in no acute distress.  HEENT: Pupils equal, round. Sclerae nonicteric.    NECK: Supple, no masses appreciated.   C/V:  Regular rate and rhythm, normal S1 and S2, no S3 or S4, no murmur, rub or gallop.   RESP: Respirations are unlabored. Lungs are clear to auscultation bilaterally without wheezing, rales, or rhonchi.  GI: Abdomen soft, non-tender, non-distended.  EXTREM:  No clubbing, cyanosis, or lower extremity edema bilaterally.   NEURO: Alert and oriented, cooperative. Gait steady. No gross focal deficits.   PSYCH: Affect appropriate. Mentation normal. Responds to questions appropriately.  SKIN: Warm and dry. No apparent rashes or bruising.     Recent Lab Results:  LIPID RESULTS:  Lab Results   Component Value Date    CHOL 147 11/26/2021    CHOL 148 05/24/2004    HDL 48 (L) 11/26/2021    HDL 45 05/24/2004    LDL 80 11/26/2021    LDL 82 05/24/2004    TRIG 95 11/26/2021    TRIG 102 05/24/2004    CHOLHDLRATIO 3.3 05/24/2004     LIVER ENZYME RESULTS:  Lab Results   Component Value Date    AST 26 05/24/2004    ALT 15 05/24/2004     CBC RESULTS:  Lab Results   Component Value Date    WBC 10.9 11/27/2021    RBC 4.45 11/27/2021    HGB 12.7 11/27/2021    HGB 13.9 05/14/2003    HCT 39.4 11/27/2021    MCV 89 11/27/2021    MCH 28.5 11/27/2021    MCHC 32.2 11/27/2021    RDW 13.5 11/27/2021     11/27/2021     BMP RESULTS:  Lab Results   Component Value Date     11/27/2021     05/24/2004    POTASSIUM 3.8 " 11/27/2021    POTASSIUM 3.7 05/24/2004    CHLORIDE 112 (H) 11/27/2021    CHLORIDE 106 05/24/2004    CO2 28 11/27/2021    CO2 27 05/24/2004    ANIONGAP 3 11/27/2021    ANIONGAP 9 05/24/2004     (H) 11/27/2021    GLC 98 05/26/2005    BUN 12 11/27/2021    BUN 10 05/24/2004    CR 0.66 11/27/2021    CR 0.70 05/24/2004    GFRESTIMATED >90 11/27/2021    GFRESTIMATED >80 05/24/2004    GFRESTBLACK >80 05/24/2004    JOSEFINA 8.5 11/27/2021    JOSEFINA 8.9 05/24/2004      CC  Taurus Vance MD  [id number]  [place] [address]    This note was completed in part using Dragon voice recognition software. Although reviewed after completion, some word and grammatical errors may occur.

## 2021-12-14 RX ORDER — METOPROLOL TARTRATE 50 MG
50 TABLET ORAL 2 TIMES DAILY
Qty: 180 TABLET | Refills: 3 | Status: SHIPPED | OUTPATIENT
Start: 2021-12-14 | End: 2022-08-22

## 2021-12-16 ENCOUNTER — TELEPHONE (OUTPATIENT)
Dept: CARDIOLOGY | Facility: CLINIC | Age: 61
End: 2021-12-16
Payer: COMMERCIAL

## 2021-12-16 NOTE — TELEPHONE ENCOUNTER
12/16/21 Msg recd from Roopa Dickerson NP  Can you obtain patient's cardiac testing at [facility]  she had a CT calcium score done and any other echoes or anything that was completed   Spoke w  and will fax CT Calcium Score from 2018 and last EKG done 2006  Salvatore 148 pm

## 2021-12-17 NOTE — TELEPHONE ENCOUNTER
12/17/21 Record of CT Coronary Artery Calcium Score 12/26/18 and EKG dated 4/17/06 recd from [facility]. Original to HIM for scanning, copy on [employer] desk.  Corey 1233 pm

## 2021-12-20 NOTE — TELEPHONE ENCOUNTER
Thank you for the CT coronary artery calcium score.  I have ordered the CTA coronary angiogram     JANETH Hancock CNP on 12/20/2021 at 11:58 AM

## 2022-01-27 ENCOUNTER — HOSPITAL ENCOUNTER (OUTPATIENT)
Dept: MAMMOGRAPHY | Facility: CLINIC | Age: 62
Discharge: HOME | End: 2022-01-27
Attending: OBSTETRICS & GYNECOLOGY | Admitting: OBSTETRICS & GYNECOLOGY
Payer: COMMERCIAL

## 2022-01-27 DIAGNOSIS — Z12.31 VISIT FOR SCREENING MAMMOGRAM: ICD-10-CM

## 2022-01-27 PROCEDURE — 77067 SCR MAMMO BI INCL CAD: CPT

## 2022-02-19 ENCOUNTER — HEALTH MAINTENANCE LETTER (OUTPATIENT)
Age: 62
End: 2022-02-19

## 2022-06-23 ENCOUNTER — TELEPHONE (OUTPATIENT)
Dept: CARDIOLOGY | Facility: CLINIC | Age: 62
End: 2022-06-23

## 2022-06-23 DIAGNOSIS — I47.10 PAROXYSMAL SUPRAVENTRICULAR TACHYCARDIA (H): Primary | ICD-10-CM

## 2022-06-24 ENCOUNTER — OFFICE VISIT (OUTPATIENT)
Dept: CARDIOLOGY | Facility: CLINIC | Age: 62
End: 2022-06-24
Attending: INTERNAL MEDICINE
Payer: COMMERCIAL

## 2022-06-24 VITALS
HEIGHT: 67 IN | DIASTOLIC BLOOD PRESSURE: 80 MMHG | SYSTOLIC BLOOD PRESSURE: 122 MMHG | BODY MASS INDEX: 30.92 KG/M2 | HEART RATE: 74 BPM | WEIGHT: 197 LBS

## 2022-06-24 DIAGNOSIS — I47.10 PAROXYSMAL SUPRAVENTRICULAR TACHYCARDIA (H): ICD-10-CM

## 2022-06-24 PROCEDURE — 99215 OFFICE O/P EST HI 40 MIN: CPT | Performed by: INTERNAL MEDICINE

## 2022-06-24 NOTE — PROGRESS NOTES
"Electrophysiology/ Clinic Note         H&P and Plan:     REASON FOR VISIT: Electrophysiology evaluation.      HISTORY OF PRESENT ILLNESS: 61-year old lady with history of symptomatic paroxysmal SVT, who is here for evaluation.    Patient presented with symptomatic SVT in November of last year.  She underwent EP study in 11/26/2021.  She was found to have incessant SVT/ORT.  She was found to have a left lateral accessory pathway, which was eliminated after 4 seconds of ablation.  Unfortunately, she had recurrence of the accessory pathway, and we were unable to eliminate with ablation.  Due to the length of the procedure, procedure was aborted and she was starting beta-blockers.    Today, she informs she is doing well.  However, she already continues to have episodes of SVT.  She had about 3 episodes in the last 6 months.  All the episodes self terminated and she did not require to go to the ED.    She is here today, she is interested in pursuing another ablation.  She would like to come off beta-blockers if possible.    She denies any other symptoms of chest pain, shortness of breath, lightheadedness, near-syncope or syncope.  His EKG showed normal sinus rhythm without signs of preexcitation.    PREVIOUS STUDIES (personally reviewed):  -Echo (11/25/2021): Normal V function.  EF 60 to 65%.  No significant valve disease.      ASSESSMENT AND PLAN:   1.  Paroxysmal SVT/ORT.  She failed ablation in the past, and she is interestingly have a redo ablation.  One of the issues noticed in the first ablation was catheter instability especially with a breathing variation.    I explained procedure in details.  She understand there is a 1-2% risk of cases here procedure.  At this time, I recommend having general anesthesia to minimize respiratory variation.  We will also attempt to have a procedure with a .      Rayshawn Nicholson MD    Physical Exam:  Vitals: /80   Pulse 74   Ht 1.702 m (5' 7\")   Wt 89.4 kg " (197 lb)   LMP 2005   BMI 30.85 kg/m      Constitutional:  AAO x3.  Pt is in NAD.  HEAD: normocephalic.  SKIN: Skin normal color, texture and turgor with no lesions or eruptions.  Eyes: PERRL, EOMI.  ENT:  Supple, normal JVP. No lymphadenopathy or thyroid enlargement.  Chest:  CTAB, decrease breath sound in base B/L. Rales,  Wheezing.  Cardiac:  IIR, variable sound of S1 and Normal S2.  RRR, normal  S1 and S2.  No murmurs rubs or gallop.  Systolic murmur in LLSB II/VI.  Abdomen:  Normal BS.  Soft, non-tender and non-distended.  No rebound or guarding.    Extremities:  Pedious pulses palpable B/L.  No LE edema noticed.   Neurological: Strength and sensation grossly symmetric and intact throughout.       CURRENT MEDICATIONS:  Current Outpatient Medications   Medication Sig Dispense Refill     lifitegrast (XIIDRA) 5 % opthalmic solution Place 1 drop into both eyes 2 times daily        metoprolol tartrate (LOPRESSOR) 50 MG tablet Take 1 tablet (50 mg) by mouth 2 times daily 180 tablet 3     Multiple Vitamin (MULTIVITAMIN ADULT PO) Take 1 tablet by mouth daily       Vitamin D3 (CHOLECALCIFEROL) 25 mcg (1000 units) tablet Take 25 mcg by mouth daily         ALLERGIES     Allergies   Allergen Reactions     Ibuprofen      Possible diarrhea     No Known Allergies        PAST MEDICAL HISTORY:  Past Medical History:   Diagnosis Date     NSTEMI (non-ST elevated myocardial infarction) (H)      Other facial nerve disorders     Neg neuro eval Sep02 with nearly complete resolution     SVT (supraventricular tachycardia) (H)        PAST SURGICAL HISTORY:  Past Surgical History:   Procedure Laterality Date     EP ABLATION SVT N/A 2021    Procedure: EP Ablation SVT;  Surgeon: Rayshawn Nicholson MD;  Location:  HEART CARDIAC CATH LAB     [facility] NONSPECIFIC PROCEDURE      Right breast biopsy- benign       FAMILY HISTORY:  Family History   Problem Relation Age of Onset     C.A.D. Father          60 heart attack      Diabetes Mother         type II       SOCIAL HISTORY:  Social History     Socioeconomic History     Marital status:      Spouse name: None     Number of children: None     Years of education: None     Highest education level: None   Tobacco Use     Smoking status: Never Smoker     Smokeless tobacco: Never Used   Substance and Sexual Activity     Alcohol use: Yes     Comment: rare     Drug use: No     Sexual activity: Yes     Partners: Male     Birth control/protection: Surgical     Comment: vas       Review of Systems:  Skin:        Eyes:       ENT:       Respiratory:       Cardiovascular:       Gastroenterology:      Genitourinary:       Musculoskeletal:       Neurologic:       Psychiatric:       Heme/Lymph/Imm:       Endocrine:           Recent Lab Results:  LIPID RESULTS:  Lab Results   Component Value Date    CHOL 147 11/26/2021    CHOL 148 05/24/2004    HDL 48 (L) 11/26/2021    HDL 45 05/24/2004    LDL 80 11/26/2021    LDL 82 05/24/2004    TRIG 95 11/26/2021    TRIG 102 05/24/2004    CHOLHDLRATIO 3.3 05/24/2004       LIVER ENZYME RESULTS:  Lab Results   Component Value Date    AST 26 05/24/2004    ALT 15 05/24/2004       CBC RESULTS:  Lab Results   Component Value Date    WBC 10.9 11/27/2021    RBC 4.45 11/27/2021    HGB 12.7 11/27/2021    HGB 13.9 05/14/2003    HCT 39.4 11/27/2021    MCV 89 11/27/2021    MCH 28.5 11/27/2021    MCHC 32.2 11/27/2021    RDW 13.5 11/27/2021     11/27/2021       BMP RESULTS:  Lab Results   Component Value Date     11/27/2021     05/24/2004    POTASSIUM 3.8 11/27/2021    POTASSIUM 3.7 05/24/2004    CHLORIDE 112 (H) 11/27/2021    CHLORIDE 106 05/24/2004    CO2 28 11/27/2021    CO2 27 05/24/2004    ANIONGAP 3 11/27/2021    ANIONGAP 9 05/24/2004     (H) 11/27/2021    GLC 98 05/26/2005    BUN 12 11/27/2021    BUN 10 05/24/2004    CR 0.66 11/27/2021    CR 0.70 05/24/2004    GFRESTIMATED >90 11/27/2021    GFRESTIMATED >80 05/24/2004    GFRESTBLACK >80  05/24/2004    JOSEFINA 8.5 11/27/2021    JOSEFINA 8.9 05/24/2004        A1C RESULTS:  No results found for: A1C    INR RESULTS:  Lab Results   Component Value Date    INR 0.94 11/25/2021         ECHOCARDIOGRAM  No results found for this or any previous visit (from the past 8760 hour(s)).      No orders of the defined types were placed in this encounter.    No orders of the defined types were placed in this encounter.    There are no discontinued medications.      Encounter Diagnosis   Name Primary?     Paroxysmal supraventricular tachycardia (H)          CC  Rayshawn Nicholson MD  [address]  [place] [id number]

## 2022-06-24 NOTE — LETTER
6/24/2022    [facility]  [address], [address]  [place] [address]    RE: Deepthi Villatoro       Dear Colleague,     I had the pleasure of seeing Deepthi TORIBIO Villatoro in the [facility].  Electrophysiology/ Clinic Note         H&P and Plan:     REASON FOR VISIT: Electrophysiology evaluation.      HISTORY OF PRESENT ILLNESS: 61-year old lady with history of symptomatic paroxysmal SVT, who is here for evaluation.    Patient presented with symptomatic SVT in November of last year.  She underwent EP study in 11/26/2021.  She was found to have incessant SVT/ORT.  She was found to have a left lateral accessory pathway, which was eliminated after 4 seconds of ablation.  Unfortunately, she had recurrence of the accessory pathway, and we were unable to eliminate with ablation.  Due to the length of the procedure, procedure was aborted and she was starting beta-blockers.    Today, she informs she is doing well.  However, she already continues to have episodes of SVT.  She had about 3 episodes in the last 6 months.  All the episodes self terminated and she did not require to go to the ED.    She is here today, she is interested in pursuing another ablation.  She would like to come off beta-blockers if possible.    She denies any other symptoms of chest pain, shortness of breath, lightheadedness, near-syncope or syncope.  His EKG showed normal sinus rhythm without signs of preexcitation.    PREVIOUS STUDIES (personally reviewed):  -Echo (11/25/2021): Normal V function.  EF 60 to 65%.  No significant valve disease.      ASSESSMENT AND PLAN:   1.  Paroxysmal SVT/ORT.  She failed ablation in the past, and she is interestingly have a redo ablation.  One of the issues noticed in the first ablation was catheter instability especially with a breathing variation.    I explained procedure in details.  She understand there is a 1-2% risk of cases here procedure.  At this time, I recommend  "having general anesthesia to minimize respiratory variation.  We will also attempt to have a procedure with a .      Rayshanw Nicholson MD    Physical Exam:  Vitals: /80   Pulse 74   Ht 1.702 m (5' 7\")   Wt 89.4 kg (197 lb)   LMP 05/18/2005   BMI 30.85 kg/m      Constitutional:  AAO x3.  Pt is in NAD.  HEAD: normocephalic.  SKIN: Skin normal color, texture and turgor with no lesions or eruptions.  Eyes: PERRL, EOMI.  ENT:  Supple, normal JVP. No lymphadenopathy or thyroid enlargement.  Chest:  CTAB, decrease breath sound in base B/L. Rales,  Wheezing.  Cardiac:  IIR, variable sound of S1 and Normal S2.  RRR, normal  S1 and S2.  No murmurs rubs or gallop.  Systolic murmur in LLSB II/VI.  Abdomen:  Normal BS.  Soft, non-tender and non-distended.  No rebound or guarding.    Extremities:  Pedious pulses palpable B/L.  No LE edema noticed.   Neurological: Strength and sensation grossly symmetric and intact throughout.       CURRENT MEDICATIONS:  Current Outpatient Medications   Medication Sig Dispense Refill     lifitegrast (XIIDRA) 5 % opthalmic solution Place 1 drop into both eyes 2 times daily        metoprolol tartrate (LOPRESSOR) 50 MG tablet Take 1 tablet (50 mg) by mouth 2 times daily 180 tablet 3     Multiple Vitamin (MULTIVITAMIN ADULT PO) Take 1 tablet by mouth daily       Vitamin D3 (CHOLECALCIFEROL) 25 mcg (1000 units) tablet Take 25 mcg by mouth daily         ALLERGIES     Allergies   Allergen Reactions     Ibuprofen      Possible diarrhea     No Known Allergies        PAST MEDICAL HISTORY:  Past Medical History:   Diagnosis Date     NSTEMI (non-ST elevated myocardial infarction) (H)      Other facial nerve disorders     Neg neuro eval Sep02 with nearly complete resolution     SVT (supraventricular tachycardia) (H)        PAST SURGICAL HISTORY:  Past Surgical History:   Procedure Laterality Date     EP ABLATION SVT N/A 11/26/2021    Procedure: EP Ablation SVT;  Surgeon: Rayshawn Nicholson " MD [place];  Location:  HEART CARDIAC CATH LAB     [facility] NONSPECIFIC PROCEDURE  2003    Right breast biopsy- benign       FAMILY HISTORY:  Family History   Problem Relation Age of Onset     C.A.D. Father          60 heart attack     Diabetes Mother         type II       SOCIAL HISTORY:  Social History     Socioeconomic History     Marital status:      Spouse name: None     Number of children: None     Years of education: None     Highest education level: None   Tobacco Use     Smoking status: Never Smoker     Smokeless tobacco: Never Used   Substance and Sexual Activity     Alcohol use: Yes     Comment: rare     Drug use: No     Sexual activity: Yes     Partners: Male     Birth control/protection: Surgical     Comment: vas       Review of Systems:  Skin:        Eyes:       ENT:       Respiratory:       Cardiovascular:       Gastroenterology:      Genitourinary:       Musculoskeletal:       Neurologic:       Psychiatric:       Heme/Lymph/Imm:       Endocrine:           Recent Lab Results:  LIPID RESULTS:  Lab Results   Component Value Date    CHOL 147 2021    CHOL 148 2004    HDL 48 (L) 2021    HDL 45 2004    LDL 80 2021    LDL 82 2004    TRIG 95 2021    TRIG 102 2004    CHOLHDLRATIO 3.3 2004       LIVER ENZYME RESULTS:  Lab Results   Component Value Date    AST 26 2004    ALT 15 2004       CBC RESULTS:  Lab Results   Component Value Date    WBC 10.9 2021    RBC 4.45 2021    HGB 12.7 2021    HGB 13.9 2003    HCT 39.4 2021    MCV 89 2021    MCH 28.5 2021    MCHC 32.2 2021    RDW 13.5 2021     2021       BMP RESULTS:  Lab Results   Component Value Date     2021     2004    POTASSIUM 3.8 2021    POTASSIUM 3.7 2004    CHLORIDE 112 (H) 2021    CHLORIDE 106 2004    CO2 28 2021    CO2 27 2004    ANIONGAP 3 2021     ANIONGAP 9 05/24/2004     (H) 11/27/2021    GLC 98 05/26/2005    BUN 12 11/27/2021    BUN 10 05/24/2004    CR 0.66 11/27/2021    CR 0.70 05/24/2004    GFRESTIMATED >90 11/27/2021    GFRESTIMATED >80 05/24/2004    GFRESTBLACK >80 05/24/2004    JOSEFINA 8.5 11/27/2021    JOSEFINA 8.9 05/24/2004        A1C RESULTS:  No results found for: A1C    INR RESULTS:  Lab Results   Component Value Date    INR 0.94 11/25/2021         ECHOCARDIOGRAM  No results found for this or any previous visit (from the past 8760 hour(s)).      No orders of the defined types were placed in this encounter.    No orders of the defined types were placed in this encounter.    There are no discontinued medications.      Encounter Diagnosis   Name Primary?     Paroxysmal supraventricular tachycardia (H)          CC  Rayshawn Nicholson MD  [address]  [place]  [place] [address]    Thank you for allowing me to participate in the care of your patient.      Sincerely,     Rayshawn Nicholson MD     [facility]

## 2022-08-19 ENCOUNTER — TELEPHONE (OUTPATIENT)
Dept: CARDIOLOGY | Facility: CLINIC | Age: 62
End: 2022-08-19

## 2022-08-19 DIAGNOSIS — I47.10 SVT (SUPRAVENTRICULAR TACHYCARDIA) (H): Primary | ICD-10-CM

## 2022-08-19 RX ORDER — SODIUM CHLORIDE, SODIUM LACTATE, POTASSIUM CHLORIDE, CALCIUM CHLORIDE 600; 310; 30; 20 MG/100ML; MG/100ML; MG/100ML; MG/100ML
INJECTION, SOLUTION INTRAVENOUS CONTINUOUS
Status: CANCELLED | OUTPATIENT
Start: 2022-08-19

## 2022-08-19 RX ORDER — LIDOCAINE 40 MG/G
CREAM TOPICAL
Status: CANCELLED | OUTPATIENT
Start: 2022-08-19

## 2022-08-19 NOTE — TELEPHONE ENCOUNTER
8/19/22 Called pt regarding SVT Ablation scheduled for 8/22/22. Notified of arrival time ( 1030 am on 8/22 [facility] ) , NPO after midnight on Sunday  with sips of water for am meds. Discussed meds to be held ( OTC vitamins and supplements)   and that patient will need a  for ride home and someone to stay with her x 24 hours once she arrives home . Pt planning to take home COVID test this weekend and will bring photo of negative result with her on Monday Pt expressed understanding. Corey 10 am

## 2022-08-21 ENCOUNTER — ANESTHESIA EVENT (OUTPATIENT)
Dept: CARDIOLOGY | Facility: CLINIC | Age: 62
End: 2022-08-21
Payer: COMMERCIAL

## 2022-08-22 ENCOUNTER — ANESTHESIA (OUTPATIENT)
Dept: CARDIOLOGY | Facility: CLINIC | Age: 62
End: 2022-08-22
Payer: COMMERCIAL

## 2022-08-22 ENCOUNTER — HOSPITAL ENCOUNTER (OUTPATIENT)
Facility: CLINIC | Age: 62
Discharge: HOME | End: 2022-08-22
Admitting: INTERNAL MEDICINE
Payer: COMMERCIAL

## 2022-08-22 VITALS
TEMPERATURE: 98 F | SYSTOLIC BLOOD PRESSURE: 122 MMHG | RESPIRATION RATE: 16 BRPM | BODY MASS INDEX: 30.29 KG/M2 | HEART RATE: 93 BPM | DIASTOLIC BLOOD PRESSURE: 71 MMHG | OXYGEN SATURATION: 94 % | WEIGHT: 193 LBS | HEIGHT: 67 IN

## 2022-08-22 DIAGNOSIS — I47.10 SVT (SUPRAVENTRICULAR TACHYCARDIA) (H): ICD-10-CM

## 2022-08-22 DIAGNOSIS — I47.10 PAROXYSMAL SUPRAVENTRICULAR TACHYCARDIA (H): ICD-10-CM

## 2022-08-22 LAB
ACT BLD: 143 SECONDS (ref 74–150)
ACT BLD: 228 SECONDS (ref 74–150)
ACT BLD: 271 SECONDS (ref 74–150)
ACT BLD: 305 SECONDS (ref 74–150)
ANION GAP SERPL CALCULATED.3IONS-SCNC: 2 MMOL/L (ref 3–14)
BUN SERPL-MCNC: 9 MG/DL (ref 7–30)
CALCIUM SERPL-MCNC: 9.1 MG/DL (ref 8.5–10.1)
CHLORIDE BLD-SCNC: 108 MMOL/L (ref 94–109)
CO2 SERPL-SCNC: 29 MMOL/L (ref 20–32)
CREAT SERPL-MCNC: 0.7 MG/DL (ref 0.52–1.04)
ERYTHROCYTE [DISTWIDTH] IN BLOOD BY AUTOMATED COUNT: 13.2 % (ref 10–15)
GFR SERPL CREATININE-BSD FRML MDRD: >90 ML/MIN/1.73M2
GLUCOSE BLD-MCNC: 116 MG/DL (ref 70–99)
HCT VFR BLD AUTO: 44.5 % (ref 35–47)
HGB BLD-MCNC: 14.1 G/DL (ref 11.7–15.7)
MCH RBC QN AUTO: 28.7 PG (ref 26.5–33)
MCHC RBC AUTO-ENTMCNC: 31.7 G/DL (ref 31.5–36.5)
MCV RBC AUTO: 91 FL (ref 78–100)
PLATELET # BLD AUTO: 275 10E3/UL (ref 150–450)
POTASSIUM BLD-SCNC: 4.1 MMOL/L (ref 3.4–5.3)
RBC # BLD AUTO: 4.91 10E6/UL (ref 3.8–5.2)
SODIUM SERPL-SCNC: 139 MMOL/L (ref 133–144)
WBC # BLD AUTO: 7.6 10E3/UL (ref 4–11)

## 2022-08-22 PROCEDURE — 999N000184 HC STATISTIC TELEMETRY

## 2022-08-22 PROCEDURE — 93005 ELECTROCARDIOGRAM TRACING: CPT

## 2022-08-22 PROCEDURE — 250N000009 HC RX 250: Performed by: INTERNAL MEDICINE

## 2022-08-22 PROCEDURE — C1759 CATH, INTRA ECHOCARDIOGRAPHY: HCPCS | Performed by: INTERNAL MEDICINE

## 2022-08-22 PROCEDURE — 93653 COMPRE EP EVAL TX SVT: CPT | Performed by: INTERNAL MEDICINE

## 2022-08-22 PROCEDURE — C1733 CATH, EP, OTHR THAN COOL-TIP: HCPCS | Performed by: INTERNAL MEDICINE

## 2022-08-22 PROCEDURE — C1732 CATH, EP, DIAG/ABL, 3D/VECT: HCPCS | Performed by: INTERNAL MEDICINE

## 2022-08-22 PROCEDURE — 93010 ELECTROCARDIOGRAM REPORT: CPT | Mod: XU | Performed by: INTERNAL MEDICINE

## 2022-08-22 PROCEDURE — 999N000071 HC STATISTIC HEART CATH LAB OR EP LAB

## 2022-08-22 PROCEDURE — 85347 COAGULATION TIME ACTIVATED: CPT | Mod: 91

## 2022-08-22 PROCEDURE — 999N000054 HC STATISTIC EKG NON-CHARGEABLE

## 2022-08-22 PROCEDURE — 250N000009 HC RX 250

## 2022-08-22 PROCEDURE — 85027 COMPLETE CBC AUTOMATED: CPT | Performed by: INTERNAL MEDICINE

## 2022-08-22 PROCEDURE — 258N000003 HC RX IP 258 OP 636: Performed by: INTERNAL MEDICINE

## 2022-08-22 PROCEDURE — 250N000011 HC RX IP 250 OP 636: Performed by: INTERNAL MEDICINE

## 2022-08-22 PROCEDURE — C1894 INTRO/SHEATH, NON-LASER: HCPCS | Performed by: INTERNAL MEDICINE

## 2022-08-22 PROCEDURE — 250N000025 HC SEVOFLURANE, PER MIN: Performed by: INTERNAL MEDICINE

## 2022-08-22 PROCEDURE — 36415 COLL VENOUS BLD VENIPUNCTURE: CPT | Performed by: INTERNAL MEDICINE

## 2022-08-22 PROCEDURE — 258N000003 HC RX IP 258 OP 636

## 2022-08-22 PROCEDURE — C1730 CATH, EP, 19 OR FEW ELECT: HCPCS | Performed by: INTERNAL MEDICINE

## 2022-08-22 PROCEDURE — 250N000011 HC RX IP 250 OP 636

## 2022-08-22 PROCEDURE — 82310 ASSAY OF CALCIUM: CPT | Performed by: INTERNAL MEDICINE

## 2022-08-22 PROCEDURE — 93662 INTRACARDIAC ECG (ICE): CPT | Mod: 26 | Performed by: INTERNAL MEDICINE

## 2022-08-22 PROCEDURE — 36591 DRAW BLOOD OFF VENOUS DEVICE: CPT

## 2022-08-22 PROCEDURE — C1769 GUIDE WIRE: HCPCS | Performed by: INTERNAL MEDICINE

## 2022-08-22 PROCEDURE — 370N000017 HC ANESTHESIA TECHNICAL FEE, PER MIN: Performed by: INTERNAL MEDICINE

## 2022-08-22 PROCEDURE — 272N000001 HC OR GENERAL SUPPLY STERILE: Performed by: INTERNAL MEDICINE

## 2022-08-22 PROCEDURE — C1887 CATHETER, GUIDING: HCPCS | Performed by: INTERNAL MEDICINE

## 2022-08-22 PROCEDURE — 93662 INTRACARDIAC ECG (ICE): CPT | Performed by: INTERNAL MEDICINE

## 2022-08-22 RX ORDER — OXYCODONE AND ACETAMINOPHEN 5; 325 MG/1; MG/1
1 TABLET ORAL EVERY 4 HOURS PRN
Status: DISCONTINUED | OUTPATIENT
Start: 2022-08-22 | End: 2022-08-22 | Stop reason: HOSPADM

## 2022-08-22 RX ORDER — MIDAZOLAM HCL IN 0.9 % NACL/PF 1 MG/ML
.5-6 PLASTIC BAG, INJECTION (ML) INTRAVENOUS CONTINUOUS PRN
Status: DISCONTINUED | OUTPATIENT
Start: 2022-08-22 | End: 2022-08-22 | Stop reason: HOSPADM

## 2022-08-22 RX ORDER — CEFAZOLIN SODIUM 1 G/3ML
1 INJECTION, POWDER, FOR SOLUTION INTRAMUSCULAR; INTRAVENOUS
Status: DISCONTINUED | OUTPATIENT
Start: 2022-08-22 | End: 2022-08-22 | Stop reason: HOSPADM

## 2022-08-22 RX ORDER — PROPOFOL 10 MG/ML
INJECTION, EMULSION INTRAVENOUS PRN
Status: DISCONTINUED | OUTPATIENT
Start: 2022-08-22 | End: 2022-08-22

## 2022-08-22 RX ORDER — FENTANYL CITRATE 50 UG/ML
INJECTION, SOLUTION INTRAMUSCULAR; INTRAVENOUS PRN
Status: DISCONTINUED | OUTPATIENT
Start: 2022-08-22 | End: 2022-08-22

## 2022-08-22 RX ORDER — NALOXONE HYDROCHLORIDE 0.4 MG/ML
0.2 INJECTION, SOLUTION INTRAMUSCULAR; INTRAVENOUS; SUBCUTANEOUS
Status: DISCONTINUED | OUTPATIENT
Start: 2022-08-22 | End: 2022-08-22 | Stop reason: HOSPADM

## 2022-08-22 RX ORDER — HEPARIN SODIUM 1000 [USP'U]/ML
INJECTION, SOLUTION INTRAVENOUS; SUBCUTANEOUS
Status: DISCONTINUED | OUTPATIENT
Start: 2022-08-22 | End: 2022-08-22 | Stop reason: HOSPADM

## 2022-08-22 RX ORDER — HEPARIN SODIUM 200 [USP'U]/100ML
100-500 INJECTION, SOLUTION INTRAVENOUS CONTINUOUS PRN
Status: DISCONTINUED | OUTPATIENT
Start: 2022-08-22 | End: 2022-08-22 | Stop reason: HOSPADM

## 2022-08-22 RX ORDER — EPHEDRINE SULFATE 50 MG/ML
INJECTION, SOLUTION INTRAMUSCULAR; INTRAVENOUS; SUBCUTANEOUS PRN
Status: DISCONTINUED | OUTPATIENT
Start: 2022-08-22 | End: 2022-08-22

## 2022-08-22 RX ORDER — NALOXONE HYDROCHLORIDE 0.4 MG/ML
0.4 INJECTION, SOLUTION INTRAMUSCULAR; INTRAVENOUS; SUBCUTANEOUS
Status: DISCONTINUED | OUTPATIENT
Start: 2022-08-22 | End: 2022-08-22 | Stop reason: HOSPADM

## 2022-08-22 RX ORDER — PROTAMINE SULFATE 10 MG/ML
INJECTION, SOLUTION INTRAVENOUS
Status: DISCONTINUED | OUTPATIENT
Start: 2022-08-22 | End: 2022-08-22 | Stop reason: HOSPADM

## 2022-08-22 RX ORDER — SODIUM CHLORIDE, SODIUM LACTATE, POTASSIUM CHLORIDE, CALCIUM CHLORIDE 600; 310; 30; 20 MG/100ML; MG/100ML; MG/100ML; MG/100ML
INJECTION, SOLUTION INTRAVENOUS CONTINUOUS
Status: DISCONTINUED | OUTPATIENT
Start: 2022-08-22 | End: 2022-08-22 | Stop reason: HOSPADM

## 2022-08-22 RX ORDER — DOBUTAMINE HYDROCHLORIDE 200 MG/100ML
5-40 INJECTION INTRAVENOUS CONTINUOUS PRN
Status: DISCONTINUED | OUTPATIENT
Start: 2022-08-22 | End: 2022-08-22 | Stop reason: HOSPADM

## 2022-08-22 RX ORDER — DEXAMETHASONE SODIUM PHOSPHATE 4 MG/ML
INJECTION, SOLUTION INTRA-ARTICULAR; INTRALESIONAL; INTRAMUSCULAR; INTRAVENOUS; SOFT TISSUE PRN
Status: DISCONTINUED | OUTPATIENT
Start: 2022-08-22 | End: 2022-08-22

## 2022-08-22 RX ORDER — ONDANSETRON 2 MG/ML
INJECTION INTRAMUSCULAR; INTRAVENOUS PRN
Status: DISCONTINUED | OUTPATIENT
Start: 2022-08-22 | End: 2022-08-22

## 2022-08-22 RX ORDER — HEPARIN SODIUM 200 [USP'U]/100ML
100-600 INJECTION, SOLUTION INTRAVENOUS CONTINUOUS PRN
Status: DISCONTINUED | OUTPATIENT
Start: 2022-08-22 | End: 2022-08-22 | Stop reason: HOSPADM

## 2022-08-22 RX ORDER — LIDOCAINE 40 MG/G
CREAM TOPICAL
Status: DISCONTINUED | OUTPATIENT
Start: 2022-08-22 | End: 2022-08-22 | Stop reason: HOSPADM

## 2022-08-22 RX ORDER — LIDOCAINE HYDROCHLORIDE 20 MG/ML
INJECTION, SOLUTION INFILTRATION; PERINEURAL PRN
Status: DISCONTINUED | OUTPATIENT
Start: 2022-08-22 | End: 2022-08-22

## 2022-08-22 RX ADMIN — MIDAZOLAM 2 MG: 1 INJECTION INTRAMUSCULAR; INTRAVENOUS at 12:28

## 2022-08-22 RX ADMIN — PHENYLEPHRINE HYDROCHLORIDE 100 MCG: 10 INJECTION INTRAVENOUS at 13:11

## 2022-08-22 RX ADMIN — LIDOCAINE HYDROCHLORIDE 100 MG: 20 INJECTION, SOLUTION INFILTRATION; PERINEURAL at 12:32

## 2022-08-22 RX ADMIN — ONDANSETRON 4 MG: 2 INJECTION INTRAMUSCULAR; INTRAVENOUS at 14:19

## 2022-08-22 RX ADMIN — PHENYLEPHRINE HYDROCHLORIDE 100 MCG: 10 INJECTION INTRAVENOUS at 14:19

## 2022-08-22 RX ADMIN — PHENYLEPHRINE HYDROCHLORIDE 100 MCG: 10 INJECTION INTRAVENOUS at 14:17

## 2022-08-22 RX ADMIN — PHENYLEPHRINE HYDROCHLORIDE 100 MCG: 10 INJECTION INTRAVENOUS at 13:47

## 2022-08-22 RX ADMIN — PHENYLEPHRINE HYDROCHLORIDE 50 MCG: 10 INJECTION INTRAVENOUS at 12:46

## 2022-08-22 RX ADMIN — PHENYLEPHRINE HYDROCHLORIDE 0.2 MCG/KG/MIN: 10 INJECTION INTRAVENOUS at 12:56

## 2022-08-22 RX ADMIN — PHENYLEPHRINE HYDROCHLORIDE 50 MCG: 10 INJECTION INTRAVENOUS at 12:40

## 2022-08-22 RX ADMIN — FENTANYL CITRATE 50 MCG: 50 INJECTION, SOLUTION INTRAMUSCULAR; INTRAVENOUS at 12:32

## 2022-08-22 RX ADMIN — PHENYLEPHRINE HYDROCHLORIDE 50 MCG: 10 INJECTION INTRAVENOUS at 13:35

## 2022-08-22 RX ADMIN — Medication 2.5 MG: at 12:46

## 2022-08-22 RX ADMIN — PROPOFOL 200 MG: 10 INJECTION, EMULSION INTRAVENOUS at 12:32

## 2022-08-22 RX ADMIN — PHENYLEPHRINE HYDROCHLORIDE 100 MCG: 10 INJECTION INTRAVENOUS at 14:14

## 2022-08-22 RX ADMIN — DEXAMETHASONE SODIUM PHOSPHATE 4 MG: 4 INJECTION, SOLUTION INTRA-ARTICULAR; INTRALESIONAL; INTRAMUSCULAR; INTRAVENOUS; SOFT TISSUE at 12:40

## 2022-08-22 RX ADMIN — SODIUM CHLORIDE, POTASSIUM CHLORIDE, SODIUM LACTATE AND CALCIUM CHLORIDE: 600; 310; 30; 20 INJECTION, SOLUTION INTRAVENOUS at 14:07

## 2022-08-22 RX ADMIN — PHENYLEPHRINE HYDROCHLORIDE 150 MCG: 10 INJECTION INTRAVENOUS at 13:26

## 2022-08-22 RX ADMIN — SODIUM CHLORIDE, POTASSIUM CHLORIDE, SODIUM LACTATE AND CALCIUM CHLORIDE: 600; 310; 30; 20 INJECTION, SOLUTION INTRAVENOUS at 10:54

## 2022-08-22 RX ADMIN — PHENYLEPHRINE HYDROCHLORIDE 100 MCG: 10 INJECTION INTRAVENOUS at 13:49

## 2022-08-22 RX ADMIN — Medication 2.5 MG: at 13:00

## 2022-08-22 ASSESSMENT — COPD QUESTIONNAIRES: COPD: 0

## 2022-08-22 ASSESSMENT — ACTIVITIES OF DAILY LIVING (ADL)
ADLS_ACUITY_SCORE: 35

## 2022-08-22 ASSESSMENT — ENCOUNTER SYMPTOMS: DYSRHYTHMIAS: 0

## 2022-08-22 ASSESSMENT — LIFESTYLE VARIABLES: TOBACCO_USE: 0

## 2022-08-22 NOTE — PROGRESS NOTES
Care Suites Admission Nursing Note    Patient Information  Name: Deepthi Villatoro  Age: 61 year old  Reason for admission: SVT ablation  Care Suites arrival time: 1030    Visitor Information  Name: Amandaspouse  Informed of visitor restrictions: Yes  1 visitor allowed per patient   Visitor must screen negative for COVID symptoms   Visitor must wear a mask  Waiting rooms closed to visitors    Patient Admission/Assessment   Pre-procedure assessment complete: Yes  If abnormal assessment/labs, provider notified: N/A  NPO: Yes  Medications held per instructions/orders: Yes  Consent: obtained  If applicable, pregnancy test status: n/a  Patient oriented to room: Yes  Education/questions answered: Yes  Plan/other: proceed    Discharge Planning  Discharge name/phone number: Darnell-spouse   Overnight post sedation caregiver: spouse  Discharge location: home    Sonny Pinto RN

## 2022-08-22 NOTE — DISCHARGE INSTRUCTIONS
SVT Ablation Discharge Instructions      After you go home:    Have an adult stay with you until tomorrow.  You may resume your normal diet.       For 24 hours - due to the sedation you received:  Relax and take it easy.  Do NOT make any important or legal decisions.  Do NOT drive or operate machines at home or at work.  Do NOT drink alcohol.    Care of Puncture Sites:    For the first 24 hrs - check the puncture site every 1-2 hours while awake.  For 2 days, when you cough, sneeze, laugh or move your bowels, hold your hand over the puncture site and press firmly.  Remove the dressing(s) after 24 hours. If there is minor oozing, apply a bandaid and remove it after 12 hours.  It is normal to have a small bruise, pea sized lump or soreness at the site.  You may shower tomorrow. Do NOT take a bath, or use a hot tub or pool for at least 3 days. Do NOT scrub the site. Do not use lotion or powder near the puncture site.      Activity - For 3 days:    No stooping or squatting  Do NOT do any heavy activity such as exercise, lifting, or straining.   Do NOT do housework, yard work or any activity that make you sweat  Do NOT lift more than 10 pounds      Bleeding:     If you start bleeding from the site in your groin/chest, lie down flat and press firmly on the site for 10 minutes.   Once bleeding stops, lay flat for 2 hours.  Call [facility] as soon as you can.       Call 911 right away if you have heavy bleeding or bleeding that does not stop.      Medicines:             Begin taking a baby aspirin everyday for 30 days  Take your medications, including blood thinners, unless your provider tells you not to.  If you have stopped any medicines, check with your provider about when to restart them.  If you have pain or shortness of breath, you may take Advil (ibuprofen) or Tylenol (acetaminophen).      Follow Up Appointments:    An appointment has been set up for you for follow-up care- September 22nd at 12:30  with Asiya Carcamo PA-C  You will receive a phone call the day after you go home from the hospital from Dr Davis RN - [facility] or [facility].    Call the clinic if:    You have increased pain or a large or growing hard lump around the site.  The site is red, swollen, hot or tender.  Blood or fluid is draining from the site.  You have chills or a fever greater than 101 F (38 C).  Your leg feels numb, cool or changes color.  Increased pain in the chest and/or groin.  Increased shortness of breath  Chest pain not relieved by Tylenol or Advil  New pain in the back or belly that you cannot control with Tylenol.  Recurrent irregular or fast heart rate lasting over 2 hours.  Any questions or concerns.    Heart rhythms:    You may have some irregular heartbeats. These feel very strong. They may make you feel that the fast heart rhythm is going to start again.  Give it time. The irregular beats should occur less often.       [facility]:    [phone] ( 8am-5pm M-F)  [facility] or [facility]    [phone] option 2 (7 days a week)  on call Cardiologist

## 2022-08-22 NOTE — ANESTHESIA POSTPROCEDURE EVALUATION
Patient: Deepthi Villatoro    Procedure: Procedure(s):  Ablation Supraventricular Tachycardia       Anesthesia Type:  General    Note:  Disposition: Inpatient   Postop Pain Control: Uneventful            Sign Out: Well controlled pain   PONV: No   Neuro/Psych: Uneventful            Sign Out: Acceptable/Baseline neuro status   Airway/Respiratory: Uneventful            Sign Out: Acceptable/Baseline resp. status   CV/Hemodynamics: Uneventful            Sign Out: Acceptable CV status   Other NRE: NONE   DID A NON-ROUTINE EVENT OCCUR? No           Last vitals:  Vitals Value Taken Time   /78 08/22/22 1530   Temp     Pulse 98 08/22/22 1536   Resp 13 08/22/22 1536   SpO2 93 % 08/22/22 1536   Vitals shown include unvalidated device data.    Electronically Signed By: Duy Cramer MD  August 22, 2022  3:37 PM

## 2022-08-22 NOTE — ANESTHESIA PREPROCEDURE EVALUATION
Anesthesia Pre-Procedure Evaluation    Patient: Deepthi Villatoro   MRN: [id number] : 1960        Procedure : Procedure(s):  Ablation Supraventricular Tachycardia          Past Medical History:   Diagnosis Date     NSTEMI (non-ST elevated myocardial infarction) (H)      Other facial nerve disorders     Neg neuro eval Sep02 with nearly complete resolution     SVT (supraventricular tachycardia) (H)       Past Surgical History:   Procedure Laterality Date     EP ABLATION SVT N/A 2021    Procedure: EP Ablation SVT;  Surgeon: Rayshawn Nicholson MD;  Location:  HEART CARDIAC CATH LAB     [facility] NONSPECIFIC PROCEDURE      Right breast biopsy- benign      Allergies   Allergen Reactions     Ibuprofen      Possible diarrhea     No Known Allergies       Social History     Tobacco Use     Smoking status: Never Smoker     Smokeless tobacco: Never Used   Substance Use Topics     Alcohol use: Yes     Comment: rare      Wt Readings from Last 1 Encounters:   22 87.5 kg (193 lb)        Anesthesia Evaluation   Pt has had prior anesthetic. Type: MAC.    No history of anesthetic complications       ROS/MED HX  ENT/Pulmonary:    (-) tobacco use, asthma, COPD, sleep apnea and recent URI   Neurologic:  - neg neurologic ROS     Cardiovascular:     (+) -----Irregular Heartbeat/Palpitations,  (-) hypertension, CAD, arrhythmias and dyslipidemia   METS/Exercise Tolerance:     Hematologic:  - neg hematologic  ROS     Musculoskeletal:       GI/Hepatic:    (-) GERD and liver disease   Renal/Genitourinary:    (-) renal disease   Endo:    (-) Type I DM and Type II DM   Psychiatric/Substance Use:       Infectious Disease:       Malignancy:       Other:            Physical Exam    Airway        Mallampati: III   TM distance: > 3 FB   Neck ROM: full   Mouth opening: > 3 cm    Respiratory Devices and Support         Dental  no notable dental history         Cardiovascular   cardiovascular exam normal          Pulmonary    pulmonary exam normal                OUTSIDE LABS:  CBC:   Lab Results   Component Value Date    WBC 7.6 08/22/2022    WBC 10.9 11/27/2021    HGB 14.1 08/22/2022    HGB 12.7 11/27/2021    HCT 44.5 08/22/2022    HCT 39.4 11/27/2021     08/22/2022     11/27/2021     BMP:   Lab Results   Component Value Date     08/22/2022     11/27/2021    POTASSIUM 4.1 08/22/2022    POTASSIUM 3.8 11/27/2021    CHLORIDE 108 08/22/2022    CHLORIDE 112 (H) 11/27/2021    CO2 29 08/22/2022    CO2 28 11/27/2021    BUN 9 08/22/2022    BUN 12 11/27/2021    CR 0.70 08/22/2022    CR 0.66 11/27/2021     (H) 08/22/2022     (H) 11/27/2021     COAGS:   Lab Results   Component Value Date    PTT 30 11/25/2021    INR 0.94 11/25/2021     POC: No results found for: BGM, HCG, HCGS  HEPATIC:   Lab Results   Component Value Date    ALBUMIN 4.1 05/24/2004    PROTTOTAL 7.6 05/24/2004    ALT 15 05/24/2004    AST 26 05/24/2004    ALKPHOS 66 05/24/2004    BILITOTAL 0.3 05/24/2004     OTHER:   Lab Results   Component Value Date    JOSEFINA 9.1 08/22/2022    PHOS 3.8 11/27/2021    MAG 2.2 11/27/2021    TSH 2.13 11/25/2021       Anesthesia Plan    ASA Status:  2   NPO Status:  NPO Appropriate    Anesthesia Type: General.     - Airway: ETT   Induction: Intravenous.   Maintenance: Inhalation.        Consents    Anesthesia Plan(s) and associated risks, benefits, and realistic alternatives discussed. Questions answered and patient/representative(s) expressed understanding.    - Discussed:     - Discussed with:  Patient      - Extended Intubation/Ventilatory Support Discussed: No.      - Patient is DNR/DNI Status: No    Use of blood products discussed: Yes.     - Discussed with: Patient.     - Consented: consented to blood products            Reason for refusal: other.     Postoperative Care    Pain management: IV analgesics.   PONV prophylaxis: Ondansetron (or other 5HT-3), Dexamethasone or Solumedrol     Comments:           H&P  reviewed: Unable to attach H&P to encounter due to EHR limitations. H&P Update: appropriate H&P reviewed, patient examined. No interval changes since H&P (within 30 days).         Duy Cramer MD

## 2022-08-22 NOTE — ANESTHESIA PROCEDURE NOTES
Airway       Patient location during procedure: OR  Staff -        Anesthesiologist:  Duy Cramer MD       CRNA: Elena Amador APRN CRNA       Performed By: CRNA  Consent for Airway        Urgency: elective  Indications and Patient Condition       Indications for airway management: janet-procedural       Induction type:intravenous       Mask difficulty assessment: 0 - not attempted    Final Airway Details       Final airway type: supraglottic airway    Supraglottic Airway Details        Type: LMA       Brand: I-Gel       LMA size: 4    Post intubation assessment        Placement verified by: capnometry, equal breath sounds and chest rise        Number of attempts at approach: 1       Secured with: silk tape       Ease of procedure: easy       Dentition: Intact and Unchanged

## 2022-08-22 NOTE — ANESTHESIA CARE TRANSFER NOTE
Patient: Deepthi Villatoro    Procedure: Procedure(s):  Ablation Supraventricular Tachycardia       Diagnosis: Super ventricular tachycardia  Diagnosis Additional Information: No value filed.    Anesthesia Type:   General     Note:    Oropharynx: oropharynx clear of all foreign objects and spontaneously breathing  Level of Consciousness: awake  Oxygen Supplementation: face mask  Level of Supplemental Oxygen (L/min / FiO2): 6  Independent Airway: airway patency satisfactory and stable  Dentition: dentition unchanged  Vital Signs Stable: post-procedure vital signs reviewed and stable  Report to RN Given: handoff report given  Patient transferred to: PACU    Handoff Report: Identifed the Patient, Identified the Reponsible Provider, Reviewed the pertinent medical history, Discussed the surgical course, Reviewed Intra-OP anesthesia mangement and issues during anesthesia, Set expectations for post-procedure period and Allowed opportunity for questions and acknowledgement of understanding      Vitals:  Vitals Value Taken Time   /78    Temp     Pulse 96    Resp 12    SpO2 98        Electronically Signed By: JANETH Redmond CRNA  August 22, 2022  2:56 PM

## 2022-08-22 NOTE — PROGRESS NOTES
Care Suites Post Procedure Note    Patient Information  Name: Deepthi Villatoro  Age: 61 year old    Post Procedure  Time patient returned to Care Suites: 1620  Concerns/abnormal assessment: VSS, (R) groin with stop-cock in place, pulses at baseline.  If abnormal assessment, provider notified: N/A  Plan/Other: bedrest for 4 hours post line removal. Discharge this evening if stable.    Joelle Monaco RN

## 2022-08-22 NOTE — PRE-PROCEDURE
GENERAL PRE-PROCEDURE:   Procedure:  SVT ablation  Date/Time:  8/22/2022 11:09 AM    Written consent obtained?: Yes    Risks and benefits: Risks, benefits and alternatives were discussed    Consent given by:  Patient  Patient states understanding of procedure being performed: Yes    Patient's understanding of procedure matches consent: Yes    Procedure consent matches procedure scheduled: Yes    Expected level of sedation:  Moderate  Appropriately NPO:  Yes  Mallampati  :  Grade 2- soft palate, base of uvula, tonsillar pillars, and portion of posterior pharyngeal wall visible  Lungs:  Lungs clear with good breath sounds bilaterally  Heart:  Normal heart sounds and rate  History & Physical reviewed:  History and physical reviewed and no updates needed  Statement of review:  I have reviewed the lab findings, diagnostic data, medications, and the plan for sedation

## 2022-08-23 ENCOUNTER — TELEPHONE (OUTPATIENT)
Dept: CARDIOLOGY | Facility: CLINIC | Age: 62
End: 2022-08-23

## 2022-08-23 DIAGNOSIS — I47.10 SVT (SUPRAVENTRICULAR TACHYCARDIA) (H): Primary | ICD-10-CM

## 2022-08-23 NOTE — TELEPHONE ENCOUNTER
"8/23/22 Spoke w pt in follow up to SVT under general anesthesia yesterday. She reports feeling well but is having difficulty urinating. She had some trouble yesterday after the procedure and needed to be straight cathed. She felt better until late this am when she started experiencing frequent urination but feels like she is not emptying her bladder. She has been drinking lots of water. She noted she has a hx of frequent UTIs. Encouraged pt to reach out to PCP or to be assess at .  Reviewed other discharge instructions. Pt has no other c/o and is planning on removing groin dressing tonight at 6 pm  Reminded pt of follow up on 9/22 . Verified she has cardiology on call # phone.  Per Dr Nicholson procedure note\" Take ASA 81 mg daily for a month after procedure.\"    KHerroRN 202 pm  "

## 2022-08-23 NOTE — PROGRESS NOTES
Care Suites Discharge Nursing Note    Patient Information  Name: Deepthi Villatoro  Age: 61 year old    Discharge Education:  Discharge instructions reviewed: AVS reviewed and given  Additional education/resources provided: N/A  Patient/patient representative verbalizes understanding: Yes  Patient discharging on new medications: Yes  Medication education completed: Yes, understands to take one baby aspirin daily for 30 days    Discharge Plans:   Discharge location: home  Discharge ride contacted: Yes  Approximate discharge time: 1930    Discharge Criteria:  Discharge criteria met and vital signs stable: Yes    Patient Belongs:  Patient belongings returned to patient: Yes    Joelle Monaco RN

## 2022-08-25 LAB
ATRIAL RATE - MUSE: 66 BPM
ATRIAL RATE - MUSE: 99 BPM
DIASTOLIC BLOOD PRESSURE - MUSE: NORMAL MMHG
DIASTOLIC BLOOD PRESSURE - MUSE: NORMAL MMHG
INTERPRETATION ECG - MUSE: NORMAL
INTERPRETATION ECG - MUSE: NORMAL
P AXIS - MUSE: 28 DEGREES
P AXIS - MUSE: 64 DEGREES
PR INTERVAL - MUSE: 144 MS
PR INTERVAL - MUSE: 146 MS
QRS DURATION - MUSE: 80 MS
QRS DURATION - MUSE: 84 MS
QT - MUSE: 372 MS
QT - MUSE: 416 MS
QTC - MUSE: 436 MS
QTC - MUSE: 477 MS
R AXIS - MUSE: 11 DEGREES
R AXIS - MUSE: 54 DEGREES
SYSTOLIC BLOOD PRESSURE - MUSE: NORMAL MMHG
SYSTOLIC BLOOD PRESSURE - MUSE: NORMAL MMHG
T AXIS - MUSE: -15 DEGREES
T AXIS - MUSE: -83 DEGREES
VENTRICULAR RATE- MUSE: 66 BPM
VENTRICULAR RATE- MUSE: 99 BPM

## 2022-08-25 NOTE — TELEPHONE ENCOUNTER
8/25/22 Spoke w pt who said she did go to PCP at [facility] on 8/23. She brought in a sample in a container from home because she was worried she would not be able to urinate for a sample in the office.  She states the sample shpwed a very very small amt of bacteria and so the MD was reluctant to start her on an antibiotic as pt was feeling better and sx subsided.  Pt states she feels back to baseline but may go back tomorrow for another culture in the office as she feels like the sx she was having reminded her of prior sx she used to have when she would get freq UTIS.  Reassured pt that would be a good idea  Pt voiced understanding and agreement with plan.   Corey 1050 am

## 2022-08-25 NOTE — TELEPHONE ENCOUNTER
8/25/22 Left message on pts VM to check and see if/when she went in to be assessed for urination issues post Ablation.  LM and requested callback w update. Corey 930 am

## 2022-09-19 NOTE — PROGRESS NOTES
"[facility]    I had the pleasure of seeing Deepthi when she came for follow up of repeat SVT ablation.  This 61 year old sees Dr. Nicholson for her history of:    1.  SVT - incessant SVT/ORT noted 11/2021 with L lateral AP seen.  This was eliminated after 4 seconds of ablation, but recurred.  She was started on BB, now s/p ablation of multiple L lateral APs 8/22/2022    Dr. Nicholson saw her most recently 6/24/2022 at which time they reviewed her EP study 11/2021 with ablation of her L lateral AP.  This had recurred, and she was having frequent episodes of SVT despite BB therapy.  Dr. Nicholson agreed to proceed with repeat EP study/ablation, this time done under general anesthesia to minimize respiratory variation, which had resulted in catheter instability during her first procedure.    Therefore, on 8/22/2022, she underwent repeat EP study.  This showed multiple L lateral APs which were ablated.  Follow-up EP study on and off Isuprel showed no recurrence of tachycardia or accessory pathways  ASA 81 mg x 1 month recommended and she was discharged home the same day off of metoprolol tartrate 50 mg BID.  Of note, she did require straight cathing for urinary retention following the procedure.    Interval History:  Shirley feels \"amazing\" and \"knows her heart is different.\" No issues with fever, chills, SOB or CP following procedure.    No recurrent palpitations. No dizziness, lightheadedness.     BPs at home look good. She's back to walking but hasn't started harder workouts yet    VITALS:  Vitals: /78   Pulse 80   Ht 1.702 m (5' 7\")   Wt 87.5 kg (193 lb)   LMP 05/18/2005   SpO2 97%   BMI 30.23 kg/m      Diagnostic Testing:  EKG today, which I overread, showed SR 80bpm. Nonspecific STTW changes  Echocardiogram 11/2021-LVEF 60-65%.  Normal valves    Plan:  1. No EP follow-up required  2. OK to stop ASA    Assessment/Plan:    1. SVT    As above, s/p L lateral ablation 11/2021 with recurrence after 4 " minutes.AP     Despite BB therapy, continued to have episodes and repeat ablation 8/22/2022 revealed multiple APs.  Dr. Nicholson noted he could not rule out possibility of a single AP with different exits into the LA but ultimately, s/p successful ablation with elimination of several left LA pathways.    Metoprolol tartrate 50 mg BID was stopped at time of procedure.    ASA x30 days recommended    PLAN:    Stop ASA as 30 days from procedure    As she's doing so well without recurrent arrhythmias, no EP follow-up required. Encouraged to contact us with any questions/concerns/recurrent arrhythmias          Asiya Carcamo PA-C, MSPAS      Orders Placed This Encounter   Procedures     EKG 12-lead complete w/read - Clinics (performed today)     No orders of the defined types were placed in this encounter.    Medications Discontinued During This Encounter   Medication Reason     aspirin (ASA) 81 MG EC tablet Therapy completed         Encounter Diagnosis   Name Primary?     SVT (supraventricular tachycardia) (H)        CURRENT MEDICATIONS:  Current Outpatient Medications   Medication Sig Dispense Refill     lifitegrast (XIIDRA) 5 % opthalmic solution Place 1 drop into both eyes 2 times daily        Multiple Vitamin (MULTIVITAMIN ADULT PO) Take 1 tablet by mouth daily       Vitamin D3 (CHOLECALCIFEROL) 25 mcg (1000 units) tablet Take 25 mcg by mouth daily         ALLERGIES     Allergies   Allergen Reactions     Ibuprofen      Possible diarrhea     No Known Allergies          Review of Systems:  Skin:  Positive for rash   Eyes:  Positive for glasses;contacts  ENT:  Negative    Respiratory:  Negative for shortness of breath;dyspnea on exertion;cough  Cardiovascular:  Negative for;palpitations;chest pain;edema;dizziness;lightheadedness;fatigue    Gastroenterology: Negative for hematochezia;heartburn;melena  Genitourinary:  Negative    Musculoskeletal:  Negative    Neurologic:  Negative    Psychiatric:  Negative    Heme/Lymph/Imm:   "Positive for allergies  Endocrine:  Negative      Physical Exam:  Vitals: /78   Pulse 80   Ht 1.702 m (5' 7\")   Wt 87.5 kg (193 lb)   LMP 05/18/2005   SpO2 97%   BMI 30.23 kg/m      Constitutional:  cooperative, alert and oriented, well developed, well nourished, in no acute distress        Skin:  warm and dry to the touch, no apparent skin lesions or masses noted        Head:  normocephalic, no masses or lesions        Eyes:  conjunctivae and lids unremarkable;no xanthalasma;sclera white        ENT:  not assessed this visit        Neck:  not assessed this visit        Chest:  normal breath sounds, clear to auscultation, normal A-P diameter, normal symmetry, normal respiratory excursion, no use of accessory muscles        Cardiac: regular rhythm, normal S1/S2, no S3 or S4, apical impulse not displaced, no murmurs, gallops or rubs                  Abdomen:  abdomen soft        Vascular: pulses full and equal                                 R groin site without hematoma, tenderness or bruit    Extremities and Back:  no edema;no deformities, clubbing, cyanosis, erythema observed        Neurological:  no gross motor deficits            PAST MEDICAL HISTORY:  Past Medical History:   Diagnosis Date     NSTEMI (non-ST elevated myocardial infarction) (H)      Other facial nerve disorders     Neg neuro eval Sep02 with nearly complete resolution     SVT (supraventricular tachycardia) (H)        PAST SURGICAL HISTORY:  Past Surgical History:   Procedure Laterality Date     EP ABLATION SVT N/A 11/26/2021    Procedure: EP Ablation SVT;  Surgeon: Rayshawn Nicholson MD;  Location:  HEART CARDIAC CATH LAB     EP ABLATION SVT N/A 8/22/2022    Procedure: Ablation Supraventricular Tachycardia;  Surgeon: Rayshawn Nicholson MD;  Location:  HEART CARDIAC CATH LAB     [facility] NONSPECIFIC PROCEDURE  2003    Right breast biopsy- benign       FAMILY HISTORY:  Family History   Problem Relation Age of Onset     C.A.D. Father  "         60 heart attack     Diabetes Mother         type II       SOCIAL HISTORY:  Social History     Socioeconomic History     Marital status:      Spouse name: None     Number of children: None     Years of education: None     Highest education level: None   Tobacco Use     Smoking status: Never Smoker     Smokeless tobacco: Never Used   Substance and Sexual Activity     Alcohol use: Yes     Comment: rare     Drug use: No     Sexual activity: Yes     Partners: Male     Birth control/protection: Surgical     Comment: vas

## 2022-09-22 ENCOUNTER — OFFICE VISIT (OUTPATIENT)
Dept: CARDIOLOGY | Facility: CLINIC | Age: 62
End: 2022-09-22
Attending: INTERNAL MEDICINE
Payer: COMMERCIAL

## 2022-09-22 VITALS
SYSTOLIC BLOOD PRESSURE: 116 MMHG | DIASTOLIC BLOOD PRESSURE: 78 MMHG | HEART RATE: 80 BPM | WEIGHT: 193 LBS | BODY MASS INDEX: 30.29 KG/M2 | HEIGHT: 67 IN | OXYGEN SATURATION: 97 %

## 2022-09-22 DIAGNOSIS — I47.10 SVT (SUPRAVENTRICULAR TACHYCARDIA) (H): ICD-10-CM

## 2022-09-22 PROCEDURE — 99214 OFFICE O/P EST MOD 30 MIN: CPT | Performed by: PHYSICIAN ASSISTANT

## 2022-09-22 PROCEDURE — 93000 ELECTROCARDIOGRAM COMPLETE: CPT | Performed by: PHYSICIAN ASSISTANT

## 2022-09-22 NOTE — LETTER
"9/22/2022    [facility]  [address]  [place] [address]    RE: Deepthi Villatoro       Dear Colleague,     I had the pleasure of seeing Deepthi ROONEY Brandengideon in the [facility].  [facility]    I had the pleasure of seeing Deepthi when she came for follow up of repeat SVT ablation.  This 61 year old sees Dr. Nicholson for her history of:    1.  SVT - incessant SVT/ORT noted 11/2021 with L lateral AP seen.  This was eliminated after 4 seconds of ablation, but recurred.  She was started on BB, now s/p ablation of multiple L lateral APs 8/22/2022    Dr. Nicholson saw her most recently 6/24/2022 at which time they reviewed her EP study 11/2021 with ablation of her L lateral AP.  This had recurred, and she was having frequent episodes of SVT despite BB therapy.  Dr. Nicholson agreed to proceed with repeat EP study/ablation, this time done under general anesthesia to minimize respiratory variation, which had resulted in catheter instability during her first procedure.    Therefore, on 8/22/2022, she underwent repeat EP study.  This showed multiple L lateral APs which were ablated.  Follow-up EP study on and off Isuprel showed no recurrence of tachycardia or accessory pathways  ASA 81 mg x 1 month recommended and she was discharged home the same day off of metoprolol tartrate 50 mg BID.  Of note, she did require straight cathing for urinary retention following the procedure.    Interval History:  Shirley feels \"amazing\" and \"knows her heart is different.\" No issues with fever, chills, SOB or CP following procedure.    No recurrent palpitations. No dizziness, lightheadedness.     BPs at home look good. She's back to walking but hasn't started harder workouts yet    VITALS:  Vitals: /78   Pulse 80   Ht 1.702 m (5' 7\")   Wt 87.5 kg (193 lb)   LMP 05/18/2005   SpO2 97%   BMI 30.23 kg/m      Diagnostic Testing:  EKG today, which I overread, showed SR " 80bpm. Nonspecific STTW changes  Echocardiogram 11/2021-LVEF 60-65%.  Normal valves    Plan:  1. No EP follow-up required  2. OK to stop ASA    Assessment/Plan:    1. SVT    As above, s/p L lateral ablation 11/2021 with recurrence after 4 minutes.AP     Despite BB therapy, continued to have episodes and repeat ablation 8/22/2022 revealed multiple APs.  Dr. Nicholson noted he could not rule out possibility of a single AP with different exits into the LA but ultimately, s/p successful ablation with elimination of several left LA pathways.    Metoprolol tartrate 50 mg BID was stopped at time of procedure.    ASA x30 days recommended    PLAN:    Stop ASA as 30 days from procedure    As she's doing so well without recurrent arrhythmias, no EP follow-up required. Encouraged to contact us with any questions/concerns/recurrent arrhythmias          Asiya Carcamo PA-C, MSPAS      Orders Placed This Encounter   Procedures     EKG 12-lead complete w/read - Clinics (performed today)     No orders of the defined types were placed in this encounter.    Medications Discontinued During This Encounter   Medication Reason     aspirin (ASA) 81 MG EC tablet Therapy completed         Encounter Diagnosis   Name Primary?     SVT (supraventricular tachycardia) (H)        CURRENT MEDICATIONS:  Current Outpatient Medications   Medication Sig Dispense Refill     lifitegrast (XIIDRA) 5 % opthalmic solution Place 1 drop into both eyes 2 times daily        Multiple Vitamin (MULTIVITAMIN ADULT PO) Take 1 tablet by mouth daily       Vitamin D3 (CHOLECALCIFEROL) 25 mcg (1000 units) tablet Take 25 mcg by mouth daily         ALLERGIES     Allergies   Allergen Reactions     Ibuprofen      Possible diarrhea     No Known Allergies          Review of Systems:  Skin:  Positive for rash   Eyes:  Positive for glasses;contacts  ENT:  Negative    Respiratory:  Negative for shortness of breath;dyspnea on exertion;cough  Cardiovascular:  Negative  "for;palpitations;chest pain;edema;dizziness;lightheadedness;fatigue    Gastroenterology: Negative for hematochezia;heartburn;melena  Genitourinary:  Negative    Musculoskeletal:  Negative    Neurologic:  Negative    Psychiatric:  Negative    Heme/Lymph/Imm:  Positive for allergies  Endocrine:  Negative      Physical Exam:  Vitals: /78   Pulse 80   Ht 1.702 m (5' 7\")   Wt 87.5 kg (193 lb)   LMP 05/18/2005   SpO2 97%   BMI 30.23 kg/m      Constitutional:  cooperative, alert and oriented, well developed, well nourished, in no acute distress        Skin:  warm and dry to the touch, no apparent skin lesions or masses noted        Head:  normocephalic, no masses or lesions        Eyes:  conjunctivae and lids unremarkable;no xanthalasma;sclera white        ENT:  not assessed this visit        Neck:  not assessed this visit        Chest:  normal breath sounds, clear to auscultation, normal A-P diameter, normal symmetry, normal respiratory excursion, no use of accessory muscles        Cardiac: regular rhythm, normal S1/S2, no S3 or S4, apical impulse not displaced, no murmurs, gallops or rubs                  Abdomen:  abdomen soft        Vascular: pulses full and equal                                 R groin site without hematoma, tenderness or bruit    Extremities and Back:  no edema;no deformities, clubbing, cyanosis, erythema observed        Neurological:  no gross motor deficits            PAST MEDICAL HISTORY:  Past Medical History:   Diagnosis Date     NSTEMI (non-ST elevated myocardial infarction) (H)      Other facial nerve disorders     Neg neuro eval Sep02 with nearly complete resolution     SVT (supraventricular tachycardia) (H)        PAST SURGICAL HISTORY:  Past Surgical History:   Procedure Laterality Date     EP ABLATION SVT N/A 11/26/2021    Procedure: EP Ablation SVT;  Surgeon: Rayshawn Nicholson MD;  Location:  HEART CARDIAC CATH LAB     EP ABLATION SVT N/A 8/22/2022    Procedure: Ablation " Supraventricular Tachycardia;  Surgeon: Rayshawn Nicholson MD;  Location:  HEART CARDIAC CATH LAB     [facility] NONSPECIFIC PROCEDURE  2003    Right breast biopsy- benign       FAMILY HISTORY:  Family History   Problem Relation Age of Onset     C.A.D. Father          60 heart attack     Diabetes Mother         type II       SOCIAL HISTORY:  Social History     Socioeconomic History     Marital status:      Spouse name: None     Number of children: None     Years of education: None     Highest education level: None   Tobacco Use     Smoking status: Never Smoker     Smokeless tobacco: Never Used   Substance and Sexual Activity     Alcohol use: Yes     Comment: rare     Drug use: No     Sexual activity: Yes     Partners: Male     Birth control/protection: Surgical     Comment: vas           Thank you for allowing me to participate in the care of your patient.      Sincerely,     Indy Carcamo PA-C     [facility]  cc:   Rayshawn Nicholson MD  [address] [place] [id number]

## 2022-09-22 NOTE — PATIENT INSTRUCTIONS
Shirley - it was good to meet you today!    EKG today looked good  Reviewed the EP Study showed multiple sites that required ablation for what looks like at least 4 Accessory Pathways. After the procedure, Dr. Nicholson could not induce funny rhythms again, which is great news!       PLAN:  OK to get back to normal activities  Stay off of metoprolol; OK to stop aspirin  NO follow-up with planned but please call if ANY issues/concerns!  [phone]

## 2022-10-22 ENCOUNTER — HEALTH MAINTENANCE LETTER (OUTPATIENT)
Age: 62
End: 2022-10-22

## 2023-02-06 ENCOUNTER — HOSPITAL ENCOUNTER (OUTPATIENT)
Dept: MAMMOGRAPHY | Facility: CLINIC | Age: 63
Discharge: HOME | End: 2023-02-06
Admitting: OBSTETRICS & GYNECOLOGY
Payer: COMMERCIAL

## 2023-02-06 DIAGNOSIS — Z12.31 VISIT FOR SCREENING MAMMOGRAM: ICD-10-CM

## 2023-02-06 PROCEDURE — 77067 SCR MAMMO BI INCL CAD: CPT

## 2023-04-01 ENCOUNTER — HEALTH MAINTENANCE LETTER (OUTPATIENT)
Age: 63
End: 2023-04-01

## 2024-02-14 ENCOUNTER — HOSPITAL ENCOUNTER (OUTPATIENT)
Dept: MAMMOGRAPHY | Facility: CLINIC | Age: 64
Discharge: HOME | End: 2024-02-14
Payer: COMMERCIAL

## 2024-02-14 DIAGNOSIS — Z12.31 VISIT FOR SCREENING MAMMOGRAM: ICD-10-CM

## 2024-02-14 PROCEDURE — 77063 BREAST TOMOSYNTHESIS BI: CPT

## 2024-03-18 NOTE — PROGRESS NOTES
.   I personally performed the service described in the documentation recorded by the scribe in my presence, and it accurately and completely records my words and actions.

## 2024-04-22 ENCOUNTER — LAB REQUISITION (OUTPATIENT)
Dept: LAB | Facility: CLINIC | Age: 64
End: 2024-04-22

## 2024-04-22 DIAGNOSIS — Z01.419 ENCOUNTER FOR GYNECOLOGICAL EXAMINATION (GENERAL) (ROUTINE) WITHOUT ABNORMAL FINDINGS: ICD-10-CM

## 2024-04-22 PROCEDURE — 87624 HPV HI-RISK TYP POOLED RSLT: CPT | Performed by: OBSTETRICS & GYNECOLOGY

## 2024-04-22 PROCEDURE — G0145 SCR C/V CYTO,THINLAYER,RESCR: HCPCS | Performed by: OBSTETRICS & GYNECOLOGY

## 2024-04-24 LAB
BKR LAB AP GYN ADEQUACY: NORMAL
BKR LAB AP GYN INTERPRETATION: NORMAL
BKR LAB AP HPV REFLEX: NORMAL
BKR LAB AP LMP: NORMAL
BKR LAB AP PREVIOUS ABNL DX: NORMAL
BKR LAB AP PREVIOUS ABNORMAL: NORMAL
PATH REPORT.COMMENTS IMP SPEC: NORMAL
PATH REPORT.COMMENTS IMP SPEC: NORMAL
PATH REPORT.RELEVANT HX SPEC: NORMAL

## 2024-04-26 LAB
HUMAN PAPILLOMA VIRUS 16 DNA: NEGATIVE
HUMAN PAPILLOMA VIRUS 18 DNA: NEGATIVE
HUMAN PAPILLOMA VIRUS FINAL DIAGNOSIS: NORMAL
HUMAN PAPILLOMA VIRUS OTHER HR: NEGATIVE

## 2024-06-02 ENCOUNTER — HEALTH MAINTENANCE LETTER (OUTPATIENT)
Age: 64
End: 2024-06-02

## 2024-12-05 ENCOUNTER — TELEPHONE (OUTPATIENT)
Dept: CARDIOLOGY | Facility: CLINIC | Age: 64
End: 2024-12-05
Payer: COMMERCIAL

## 2024-12-05 NOTE — TELEPHONE ENCOUNTER
12/05/24 Spoke w pt who has concerns about HR over the last couple days. She has not been real active , just playing tennis few days a week . She tried to walk 2 days ago around the track. She started at a vigorous pace and noticed her HR per FitBit was about 160 bpm. This also happened yesterday when she went for another walk . She denies SOB, dizziness or chest pain. HR returns to normal when she stops and rests .  Discussed that what she is discussing sounds like some deconditioning. Advised she should start walking program slowly and gradually increase and monitor HRS. She should see an improvement but if not she will let us know.Pt voiced understanding and agreement with plan.   Corey 325 pm

## 2024-12-05 NOTE — TELEPHONE ENCOUNTER
[facility] Call Center    Phone Message    May a detailed message be left on voicemail: yes     Reason for Call: Symptoms or Concerns     If patient has red-flag symptoms, warm transfer to triage line    Current symptom or concern: increased heart rate when walking, up to 160, when she stops walking her heart rate goes back down     Symptoms have been present for:  3-4 day(s)    Has patient previously been seen for this? No        Are there any new or worsening symptoms? No    Action Taken: Other: cardio    Travel Screening: Not Applicable     Date of Service:

## 2025-02-19 ENCOUNTER — HOSPITAL ENCOUNTER (OUTPATIENT)
Dept: MAMMOGRAPHY | Facility: CLINIC | Age: 65
Discharge: HOME | End: 2025-02-19
Attending: OBSTETRICS & GYNECOLOGY
Payer: COMMERCIAL

## 2025-02-19 DIAGNOSIS — Z12.31 VISIT FOR SCREENING MAMMOGRAM: ICD-10-CM

## 2025-02-19 PROCEDURE — 77063 BREAST TOMOSYNTHESIS BI: CPT

## 2025-04-07 ENCOUNTER — OFFICE VISIT (OUTPATIENT)
Dept: SURGERY | Facility: CLINIC | Age: 65
End: 2025-04-07
Payer: COMMERCIAL

## 2025-04-07 VITALS
SYSTOLIC BLOOD PRESSURE: 112 MMHG | HEIGHT: 67 IN | BODY MASS INDEX: 29.82 KG/M2 | OXYGEN SATURATION: 95 % | WEIGHT: 190 LBS | HEART RATE: 102 BPM | DIASTOLIC BLOOD PRESSURE: 62 MMHG

## 2025-04-07 DIAGNOSIS — R22.0 SCALP MASS: Primary | ICD-10-CM

## 2025-04-07 PROCEDURE — 99203 OFFICE O/P NEW LOW 30 MIN: CPT | Performed by: SURGERY

## 2025-04-07 PROCEDURE — 3074F SYST BP LT 130 MM HG: CPT | Performed by: SURGERY

## 2025-04-07 PROCEDURE — 3078F DIAST BP <80 MM HG: CPT | Performed by: SURGERY

## 2025-04-07 NOTE — LETTER
April 8, 2025          Bhupendra Phan PA-C  [address]  [place] [address]      RE:   Deepthi Villatoro 1960      Dear Colleague,    Thank you for referring your patient, Deepthi Villatoro, to [facility]. Please see a copy of my visit note below.    Have the pleasure to meet with Mrs. Villatoro to discuss surgical treatment for some enlarging scalp masses.  She has had at least 1 smaller mass on her scalp removed in the past.  Now she feels to 1 that is on the back of her head is growing becoming bothersome.     On exam she has 3 palpable scalp masses.  2 of them are in the occipital area and measure about 2 cm and 1 cm respectively.  There is also a 1 cm 1 in the right frontoparietal area.  They will seem to be lipomas or inclusion cyst.  There is no drainage nor skin changes associated with them.     Scalp masses x 3     I discussed with her the rationale for excision.  Given the location we will do this under local anesthesia but in the operating room.     She understands the risk, benefits, hopeful outcomes, and possible complications.  She will call us back when she is ready to proceed.    Again, thank you for allowing me to participate in the care of your patient.      Sincerely,      Markell De Los Santos MD

## 2025-04-08 NOTE — PROGRESS NOTES
General Surgery clinic note    Have the pleasure to meet with Mrs. Villatoro to discuss surgical treatment for some enlarging scalp masses.  She has had at least 1 smaller mass on her scalp removed in the past.  Now she feels to 1 that is on the back of her head is growing becoming bothersome.    On exam she has 3 palpable scalp masses.  2 of them are in the occipital area and measure about 2 cm and 1 cm respectively.  There is also a 1 cm 1 in the right frontoparietal area.  They will seem to be lipomas or inclusion cyst.  There is no drainage nor skin changes associated with them.    Scalp masses x 3    I discussed with her the rationale for excision.  Given the location we will do this under local anesthesia but in the operating room.    She understands the risk, benefits, hopeful outcomes, and possible complications.  She will call us back when she is ready to proceed.    Total encounter time 30 minutes, more than half spent in counseling, review of data, anticoagulation clinic.

## 2025-06-14 ENCOUNTER — HEALTH MAINTENANCE LETTER (OUTPATIENT)
Age: 65
End: 2025-06-14

## (undated) DEVICE — DEFIB PRO-PADZ LVP LQD GEL ADULT 8900-2105-01

## (undated) DEVICE — CATH THERMOCOOL SMARTTOUCH SF FJ CURVE

## (undated) DEVICE — PATCH CARTO 3 EXTERNAL REFERENCE 3D MAPPING CREFP6

## (undated) DEVICE — INTRO SHEATH 7FRX10CM PINNACLE RSS702

## (undated) DEVICE — SYR ANGIO NAMIC CNTRSTINJ ROT ADPT RSVR PALM PD THB GRP FNGR

## (undated) DEVICE — CATH EP 6FR 2MM TIP 2-8-2 115C

## (undated) DEVICE — INTRO CATH 12CM 8.5FR FST-CATH

## (undated) DEVICE — GUIDEWIRE TRNSEPT 0.014 X35CM SAFE SE

## (undated) DEVICE — CATH SOUNDSTAR 8FRX90CM 10439011

## (undated) DEVICE — PACK EP SRG PROC LF DISP SAN32EPFSR

## (undated) DEVICE — INTRODUCER SHEATH FAST-CATH 9FRX12CM 406116

## (undated) DEVICE — INTRODUCER SHEATH GREEN 6.5FRX11CM .038IN PSI-6F-11-038ACT

## (undated) DEVICE — TUBE SET SMARKABLATE IRRIGATION

## (undated) DEVICE — NDL TRNSEPT 18GA X 71CM 86 DEG

## (undated) DEVICE — RAD INTRODUCER KIT MICRO 5FRX10CM .018 NITINOL G/W

## (undated) DEVICE — CATH EP 7FR X 115CM DECANAV CA

## (undated) DEVICE — INTRODUCER SHEATH FAST-CATH SWARTZ 8.5FRX63CM SL1 CVD 406849

## (undated) DEVICE — CATH EP 120CM 6FR RSPN 2-5-2MM 401261

## (undated) DEVICE — CATH EP CATH EP REPRO DAIG RSPN FX CRV DX EP C

## (undated) RX ORDER — HEPARIN SODIUM 1000 [USP'U]/ML
INJECTION, SOLUTION INTRAVENOUS; SUBCUTANEOUS
Status: DISPENSED
Start: 2021-11-26

## (undated) RX ORDER — PROTAMINE SULFATE 10 MG/ML
INJECTION, SOLUTION INTRAVENOUS
Status: DISPENSED
Start: 2021-11-26

## (undated) RX ORDER — PROTAMINE SULFATE 10 MG/ML
INJECTION, SOLUTION INTRAVENOUS
Status: DISPENSED
Start: 2022-08-22

## (undated) RX ORDER — FENTANYL CITRATE 50 UG/ML
INJECTION, SOLUTION INTRAMUSCULAR; INTRAVENOUS
Status: DISPENSED
Start: 2021-11-26

## (undated) RX ORDER — HEPARIN SODIUM 200 [USP'U]/100ML
INJECTION, SOLUTION INTRAVENOUS
Status: DISPENSED
Start: 2022-08-22

## (undated) RX ORDER — HEPARIN SODIUM 1000 [USP'U]/ML
INJECTION, SOLUTION INTRAVENOUS; SUBCUTANEOUS
Status: DISPENSED
Start: 2022-08-22

## (undated) RX ORDER — FENTANYL CITRATE 50 UG/ML
INJECTION, SOLUTION INTRAMUSCULAR; INTRAVENOUS
Status: DISPENSED
Start: 2022-08-22

## (undated) RX ORDER — LIDOCAINE HYDROCHLORIDE 10 MG/ML
INJECTION, SOLUTION EPIDURAL; INFILTRATION; INTRACAUDAL; PERINEURAL
Status: DISPENSED
Start: 2022-08-22

## (undated) RX ORDER — LIDOCAINE HYDROCHLORIDE 10 MG/ML
INJECTION, SOLUTION EPIDURAL; INFILTRATION; INTRACAUDAL; PERINEURAL
Status: DISPENSED
Start: 2021-11-26